# Patient Record
Sex: FEMALE | Race: WHITE | NOT HISPANIC OR LATINO | Employment: FULL TIME | URBAN - METROPOLITAN AREA
[De-identification: names, ages, dates, MRNs, and addresses within clinical notes are randomized per-mention and may not be internally consistent; named-entity substitution may affect disease eponyms.]

---

## 2017-01-21 ENCOUNTER — COMMUNICATION - HEALTHEAST (OUTPATIENT)
Dept: FAMILY MEDICINE | Facility: CLINIC | Age: 52
End: 2017-01-21

## 2017-01-21 DIAGNOSIS — I10 ESSENTIAL HYPERTENSION, BENIGN: ICD-10-CM

## 2017-03-07 ENCOUNTER — COMMUNICATION - HEALTHEAST (OUTPATIENT)
Dept: TELEHEALTH | Facility: CLINIC | Age: 52
End: 2017-03-07

## 2017-03-07 ENCOUNTER — HOSPITAL ENCOUNTER (OUTPATIENT)
Dept: MAMMOGRAPHY | Facility: CLINIC | Age: 52
Discharge: HOME | End: 2017-03-07
Attending: FAMILY MEDICINE

## 2017-03-07 DIAGNOSIS — Z12.31 VISIT FOR SCREENING MAMMOGRAM: ICD-10-CM

## 2017-03-08 ENCOUNTER — OFFICE VISIT - HEALTHEAST (OUTPATIENT)
Dept: FAMILY MEDICINE | Facility: CLINIC | Age: 52
End: 2017-03-08

## 2017-03-08 DIAGNOSIS — I10 ESSENTIAL HYPERTENSION, BENIGN: ICD-10-CM

## 2017-03-08 DIAGNOSIS — Z80.0 FHX: COLON CANCER: ICD-10-CM

## 2017-03-08 DIAGNOSIS — Z00.00 WELL ADULT EXAM: ICD-10-CM

## 2017-03-08 DIAGNOSIS — E78.00 PURE HYPERCHOLESTEROLEMIA: ICD-10-CM

## 2017-03-08 LAB
CHOLEST SERPL-MCNC: 202 MG/DL
FASTING STATUS PATIENT QL REPORTED: YES
HDLC SERPL-MCNC: 51 MG/DL
LDLC SERPL CALC-MCNC: 127 MG/DL
TRIGL SERPL-MCNC: 120 MG/DL

## 2017-03-08 ASSESSMENT — MIFFLIN-ST. JEOR: SCORE: 1397.04

## 2017-03-09 ENCOUNTER — COMMUNICATION - HEALTHEAST (OUTPATIENT)
Dept: FAMILY MEDICINE | Facility: CLINIC | Age: 52
End: 2017-03-09

## 2017-03-31 ENCOUNTER — COMMUNICATION - HEALTHEAST (OUTPATIENT)
Dept: FAMILY MEDICINE | Facility: CLINIC | Age: 52
End: 2017-03-31

## 2017-03-31 DIAGNOSIS — E78.00 HYPERCHOLESTEROLEMIA: ICD-10-CM

## 2017-06-23 ENCOUNTER — COMMUNICATION - HEALTHEAST (OUTPATIENT)
Dept: FAMILY MEDICINE | Facility: CLINIC | Age: 52
End: 2017-06-23

## 2017-06-23 DIAGNOSIS — I10 ESSENTIAL HYPERTENSION, BENIGN: ICD-10-CM

## 2017-09-21 ENCOUNTER — OFFICE VISIT - HEALTHEAST (OUTPATIENT)
Dept: FAMILY MEDICINE | Facility: CLINIC | Age: 52
End: 2017-09-21

## 2017-09-21 DIAGNOSIS — Z23 NEED FOR IMMUNIZATION AGAINST INFLUENZA: ICD-10-CM

## 2017-09-21 DIAGNOSIS — E78.00 PURE HYPERCHOLESTEROLEMIA: ICD-10-CM

## 2017-09-21 DIAGNOSIS — Z80.0 FHX: COLON CANCER: ICD-10-CM

## 2017-09-21 DIAGNOSIS — E78.00 HYPERCHOLESTEROLEMIA: ICD-10-CM

## 2017-09-21 DIAGNOSIS — I10 ESSENTIAL HYPERTENSION, BENIGN: ICD-10-CM

## 2017-09-21 LAB
CHOLEST SERPL-MCNC: 205 MG/DL
FASTING STATUS PATIENT QL REPORTED: YES
HDLC SERPL-MCNC: 44 MG/DL
LDLC SERPL CALC-MCNC: 131 MG/DL
TRIGL SERPL-MCNC: 151 MG/DL

## 2017-09-21 ASSESSMENT — MIFFLIN-ST. JEOR: SCORE: 1410.65

## 2017-09-22 ENCOUNTER — COMMUNICATION - HEALTHEAST (OUTPATIENT)
Dept: FAMILY MEDICINE | Facility: CLINIC | Age: 52
End: 2017-09-22

## 2018-03-14 ENCOUNTER — OFFICE VISIT - HEALTHEAST (OUTPATIENT)
Dept: FAMILY MEDICINE | Facility: CLINIC | Age: 53
End: 2018-03-14

## 2018-03-14 DIAGNOSIS — E78.00 HYPERCHOLESTEROLEMIA: ICD-10-CM

## 2018-03-14 DIAGNOSIS — R12 HEARTBURN: ICD-10-CM

## 2018-03-14 DIAGNOSIS — Z23 NEED FOR VACCINATION: ICD-10-CM

## 2018-03-14 DIAGNOSIS — E78.00 PURE HYPERCHOLESTEROLEMIA: ICD-10-CM

## 2018-03-14 DIAGNOSIS — I10 ESSENTIAL HYPERTENSION, BENIGN: ICD-10-CM

## 2018-03-14 DIAGNOSIS — Z00.00 WELL ADULT EXAM: ICD-10-CM

## 2018-03-14 LAB
ALBUMIN SERPL-MCNC: 4.2 G/DL (ref 3.5–5)
ALP SERPL-CCNC: 68 U/L (ref 45–120)
ALT SERPL W P-5'-P-CCNC: 16 U/L (ref 0–45)
ANION GAP SERPL CALCULATED.3IONS-SCNC: 9 MMOL/L (ref 5–18)
AST SERPL W P-5'-P-CCNC: 11 U/L (ref 0–40)
BILIRUB DIRECT SERPL-MCNC: 0.2 MG/DL
BILIRUB SERPL-MCNC: 0.6 MG/DL (ref 0–1)
BUN SERPL-MCNC: 14 MG/DL (ref 8–22)
CALCIUM SERPL-MCNC: 9.6 MG/DL (ref 8.5–10.5)
CHLORIDE BLD-SCNC: 106 MMOL/L (ref 98–107)
CHOLEST SERPL-MCNC: 206 MG/DL
CO2 SERPL-SCNC: 22 MMOL/L (ref 22–31)
CREAT SERPL-MCNC: 0.72 MG/DL (ref 0.6–1.1)
FASTING STATUS PATIENT QL REPORTED: YES
GFR SERPL CREATININE-BSD FRML MDRD: >60 ML/MIN/1.73M2
GLUCOSE BLD-MCNC: 107 MG/DL (ref 70–125)
HDLC SERPL-MCNC: 42 MG/DL
LDLC SERPL CALC-MCNC: 123 MG/DL
POTASSIUM BLD-SCNC: 4.2 MMOL/L (ref 3.5–5)
PROT SERPL-MCNC: 7.5 G/DL (ref 6–8)
SODIUM SERPL-SCNC: 137 MMOL/L (ref 136–145)
TRIGL SERPL-MCNC: 203 MG/DL

## 2018-03-14 ASSESSMENT — MIFFLIN-ST. JEOR: SCORE: 1412.35

## 2018-03-15 ENCOUNTER — COMMUNICATION - HEALTHEAST (OUTPATIENT)
Dept: FAMILY MEDICINE | Facility: CLINIC | Age: 53
End: 2018-03-15

## 2018-03-16 LAB
HPV SOURCE: NORMAL
HUMAN PAPILLOMA VIRUS 16 DNA: NEGATIVE
HUMAN PAPILLOMA VIRUS 18 DNA: NEGATIVE
HUMAN PAPILLOMA VIRUS FINAL DIAGNOSIS: NORMAL
HUMAN PAPILLOMA VIRUS OTHER HR: NEGATIVE
SPECIMEN DESCRIPTION: NORMAL

## 2018-03-19 ENCOUNTER — COMMUNICATION - HEALTHEAST (OUTPATIENT)
Dept: FAMILY MEDICINE | Facility: CLINIC | Age: 53
End: 2018-03-19

## 2018-03-20 ENCOUNTER — COMMUNICATION - HEALTHEAST (OUTPATIENT)
Dept: FAMILY MEDICINE | Facility: CLINIC | Age: 53
End: 2018-03-20

## 2018-03-29 ENCOUNTER — OFFICE VISIT - HEALTHEAST (OUTPATIENT)
Dept: FAMILY MEDICINE | Facility: CLINIC | Age: 53
End: 2018-03-29

## 2018-03-29 DIAGNOSIS — R51.9 HEADACHE: ICD-10-CM

## 2018-03-29 DIAGNOSIS — I10 ESSENTIAL HYPERTENSION, BENIGN: ICD-10-CM

## 2018-04-04 ENCOUNTER — HOSPITAL ENCOUNTER (OUTPATIENT)
Dept: MRI IMAGING | Facility: CLINIC | Age: 53
Discharge: HOME | End: 2018-04-04
Attending: FAMILY MEDICINE

## 2018-04-04 DIAGNOSIS — I10 ESSENTIAL HYPERTENSION, BENIGN: ICD-10-CM

## 2018-04-04 DIAGNOSIS — R51.9 HEADACHE: ICD-10-CM

## 2018-04-20 ENCOUNTER — HOSPITAL ENCOUNTER (OUTPATIENT)
Dept: MAMMOGRAPHY | Facility: CLINIC | Age: 53
Discharge: HOME | End: 2018-04-20
Attending: FAMILY MEDICINE

## 2018-04-20 DIAGNOSIS — Z12.31 VISIT FOR SCREENING MAMMOGRAM: ICD-10-CM

## 2018-07-10 ENCOUNTER — COMMUNICATION - HEALTHEAST (OUTPATIENT)
Dept: FAMILY MEDICINE | Facility: CLINIC | Age: 53
End: 2018-07-10

## 2018-07-10 DIAGNOSIS — K29.70 GASTRITIS: ICD-10-CM

## 2018-09-17 ENCOUNTER — COMMUNICATION - HEALTHEAST (OUTPATIENT)
Dept: FAMILY MEDICINE | Facility: CLINIC | Age: 53
End: 2018-09-17

## 2018-09-17 ENCOUNTER — OFFICE VISIT - HEALTHEAST (OUTPATIENT)
Dept: FAMILY MEDICINE | Facility: CLINIC | Age: 53
End: 2018-09-17

## 2018-09-17 DIAGNOSIS — E78.00 PURE HYPERCHOLESTEROLEMIA: ICD-10-CM

## 2018-09-17 DIAGNOSIS — I10 ESSENTIAL HYPERTENSION, BENIGN: ICD-10-CM

## 2018-09-17 DIAGNOSIS — K29.70 GASTRITIS: ICD-10-CM

## 2018-09-17 LAB
ALBUMIN SERPL-MCNC: 4 G/DL (ref 3.5–5)
ALP SERPL-CCNC: 74 U/L (ref 45–120)
ALT SERPL W P-5'-P-CCNC: 14 U/L (ref 0–45)
ANION GAP SERPL CALCULATED.3IONS-SCNC: 8 MMOL/L (ref 5–18)
AST SERPL W P-5'-P-CCNC: 12 U/L (ref 0–40)
BILIRUB DIRECT SERPL-MCNC: 0.1 MG/DL
BILIRUB SERPL-MCNC: 0.4 MG/DL (ref 0–1)
BUN SERPL-MCNC: 13 MG/DL (ref 8–22)
CALCIUM SERPL-MCNC: 9.1 MG/DL (ref 8.5–10.5)
CHLORIDE BLD-SCNC: 109 MMOL/L (ref 98–107)
CHOLEST SERPL-MCNC: 182 MG/DL
CO2 SERPL-SCNC: 22 MMOL/L (ref 22–31)
CREAT SERPL-MCNC: 0.68 MG/DL (ref 0.6–1.1)
FASTING STATUS PATIENT QL REPORTED: YES
GFR SERPL CREATININE-BSD FRML MDRD: >60 ML/MIN/1.73M2
GLUCOSE BLD-MCNC: 105 MG/DL (ref 70–125)
HDLC SERPL-MCNC: 42 MG/DL
LDLC SERPL CALC-MCNC: 111 MG/DL
POTASSIUM BLD-SCNC: 4.2 MMOL/L (ref 3.5–5)
PROT SERPL-MCNC: 6.8 G/DL (ref 6–8)
SODIUM SERPL-SCNC: 139 MMOL/L (ref 136–145)
TRIGL SERPL-MCNC: 146 MG/DL

## 2018-11-13 ENCOUNTER — COMMUNICATION - HEALTHEAST (OUTPATIENT)
Dept: FAMILY MEDICINE | Facility: CLINIC | Age: 53
End: 2018-11-13

## 2018-11-13 DIAGNOSIS — I10 ESSENTIAL HYPERTENSION, BENIGN: ICD-10-CM

## 2019-03-21 ENCOUNTER — OFFICE VISIT - HEALTHEAST (OUTPATIENT)
Dept: FAMILY MEDICINE | Facility: CLINIC | Age: 54
End: 2019-03-21

## 2019-03-21 DIAGNOSIS — I10 ESSENTIAL HYPERTENSION, BENIGN: ICD-10-CM

## 2019-03-21 DIAGNOSIS — Z00.00 WELL ADULT EXAM: ICD-10-CM

## 2019-03-21 DIAGNOSIS — E78.00 PURE HYPERCHOLESTEROLEMIA: ICD-10-CM

## 2019-03-21 LAB
ALBUMIN SERPL-MCNC: 4.3 G/DL (ref 3.5–5)
ALP SERPL-CCNC: 66 U/L (ref 45–120)
ALT SERPL W P-5'-P-CCNC: 13 U/L (ref 0–45)
ANION GAP SERPL CALCULATED.3IONS-SCNC: 12 MMOL/L (ref 5–18)
AST SERPL W P-5'-P-CCNC: 12 U/L (ref 0–40)
BILIRUB DIRECT SERPL-MCNC: 0.1 MG/DL
BILIRUB SERPL-MCNC: 0.3 MG/DL (ref 0–1)
BUN SERPL-MCNC: 12 MG/DL (ref 8–22)
CALCIUM SERPL-MCNC: 9.6 MG/DL (ref 8.5–10.5)
CHLORIDE BLD-SCNC: 106 MMOL/L (ref 98–107)
CHOLEST SERPL-MCNC: 190 MG/DL
CO2 SERPL-SCNC: 21 MMOL/L (ref 22–31)
CREAT SERPL-MCNC: 0.73 MG/DL (ref 0.6–1.1)
FASTING STATUS PATIENT QL REPORTED: YES
GFR SERPL CREATININE-BSD FRML MDRD: >60 ML/MIN/1.73M2
GLUCOSE BLD-MCNC: 103 MG/DL (ref 70–125)
HDLC SERPL-MCNC: 48 MG/DL
LDLC SERPL CALC-MCNC: 109 MG/DL
POTASSIUM BLD-SCNC: 4.1 MMOL/L (ref 3.5–5)
PROT SERPL-MCNC: 7.4 G/DL (ref 6–8)
SODIUM SERPL-SCNC: 139 MMOL/L (ref 136–145)
TRIGL SERPL-MCNC: 166 MG/DL

## 2019-03-21 ASSESSMENT — MIFFLIN-ST. JEOR: SCORE: 1421.43

## 2019-03-22 ENCOUNTER — COMMUNICATION - HEALTHEAST (OUTPATIENT)
Dept: FAMILY MEDICINE | Facility: CLINIC | Age: 54
End: 2019-03-22

## 2019-03-26 ENCOUNTER — COMMUNICATION - HEALTHEAST (OUTPATIENT)
Dept: FAMILY MEDICINE | Facility: CLINIC | Age: 54
End: 2019-03-26

## 2019-03-26 DIAGNOSIS — E78.00 HYPERCHOLESTEROLEMIA: ICD-10-CM

## 2019-05-13 ENCOUNTER — COMMUNICATION - HEALTHEAST (OUTPATIENT)
Dept: TELEHEALTH | Facility: CLINIC | Age: 54
End: 2019-05-13

## 2019-05-13 ENCOUNTER — HOSPITAL ENCOUNTER (OUTPATIENT)
Dept: MAMMOGRAPHY | Facility: CLINIC | Age: 54
Discharge: HOME | End: 2019-05-13
Attending: FAMILY MEDICINE

## 2019-05-13 DIAGNOSIS — Z12.31 VISIT FOR SCREENING MAMMOGRAM: ICD-10-CM

## 2019-08-18 ENCOUNTER — COMMUNICATION - HEALTHEAST (OUTPATIENT)
Dept: FAMILY MEDICINE | Facility: CLINIC | Age: 54
End: 2019-08-18

## 2019-08-18 DIAGNOSIS — K29.70 GASTRITIS: ICD-10-CM

## 2019-09-04 ENCOUNTER — COMMUNICATION - HEALTHEAST (OUTPATIENT)
Dept: FAMILY MEDICINE | Facility: CLINIC | Age: 54
End: 2019-09-04

## 2019-09-04 ENCOUNTER — OFFICE VISIT - HEALTHEAST (OUTPATIENT)
Dept: FAMILY MEDICINE | Facility: CLINIC | Age: 54
End: 2019-09-04

## 2019-09-04 DIAGNOSIS — I10 ESSENTIAL HYPERTENSION, BENIGN: ICD-10-CM

## 2019-09-04 DIAGNOSIS — E78.00 PURE HYPERCHOLESTEROLEMIA: ICD-10-CM

## 2019-09-04 DIAGNOSIS — Z11.4 SCREENING FOR HIV (HUMAN IMMUNODEFICIENCY VIRUS): ICD-10-CM

## 2019-09-04 DIAGNOSIS — K29.70 GASTRITIS: ICD-10-CM

## 2019-09-04 LAB
ALBUMIN SERPL-MCNC: 4.1 G/DL (ref 3.5–5)
ALP SERPL-CCNC: 76 U/L (ref 45–120)
ALT SERPL W P-5'-P-CCNC: 17 U/L (ref 0–45)
ANION GAP SERPL CALCULATED.3IONS-SCNC: 10 MMOL/L (ref 5–18)
AST SERPL W P-5'-P-CCNC: 16 U/L (ref 0–40)
BILIRUB DIRECT SERPL-MCNC: 0.2 MG/DL
BILIRUB SERPL-MCNC: 0.4 MG/DL (ref 0–1)
BUN SERPL-MCNC: 9 MG/DL (ref 8–22)
CALCIUM SERPL-MCNC: 9.2 MG/DL (ref 8.5–10.5)
CHLORIDE BLD-SCNC: 106 MMOL/L (ref 98–107)
CHOLEST SERPL-MCNC: 185 MG/DL
CO2 SERPL-SCNC: 22 MMOL/L (ref 22–31)
CREAT SERPL-MCNC: 0.7 MG/DL (ref 0.6–1.1)
FASTING STATUS PATIENT QL REPORTED: YES
GFR SERPL CREATININE-BSD FRML MDRD: >60 ML/MIN/1.73M2
GLUCOSE BLD-MCNC: 107 MG/DL (ref 70–125)
HDLC SERPL-MCNC: 45 MG/DL
HIV 1+2 AB+HIV1 P24 AG SERPL QL IA: NEGATIVE
LDLC SERPL CALC-MCNC: 102 MG/DL
POTASSIUM BLD-SCNC: 4.3 MMOL/L (ref 3.5–5)
PROT SERPL-MCNC: 7 G/DL (ref 6–8)
SODIUM SERPL-SCNC: 138 MMOL/L (ref 136–145)
TRIGL SERPL-MCNC: 189 MG/DL

## 2019-10-18 ENCOUNTER — COMMUNICATION - HEALTHEAST (OUTPATIENT)
Dept: FAMILY MEDICINE | Facility: CLINIC | Age: 54
End: 2019-10-18

## 2019-10-18 DIAGNOSIS — I10 ESSENTIAL HYPERTENSION, BENIGN: ICD-10-CM

## 2020-03-24 ENCOUNTER — COMMUNICATION - HEALTHEAST (OUTPATIENT)
Dept: FAMILY MEDICINE | Facility: CLINIC | Age: 55
End: 2020-03-24

## 2020-03-25 ENCOUNTER — COMMUNICATION - HEALTHEAST (OUTPATIENT)
Dept: FAMILY MEDICINE | Facility: CLINIC | Age: 55
End: 2020-03-25

## 2020-03-25 DIAGNOSIS — E78.00 HYPERCHOLESTEROLEMIA: ICD-10-CM

## 2020-04-01 ENCOUNTER — OFFICE VISIT - HEALTHEAST (OUTPATIENT)
Dept: FAMILY MEDICINE | Facility: CLINIC | Age: 55
End: 2020-04-01

## 2020-04-01 DIAGNOSIS — K29.70 GASTRITIS: ICD-10-CM

## 2020-04-01 DIAGNOSIS — E78.00 PURE HYPERCHOLESTEROLEMIA: ICD-10-CM

## 2020-04-01 DIAGNOSIS — I10 ESSENTIAL HYPERTENSION, BENIGN: ICD-10-CM

## 2020-04-01 DIAGNOSIS — K29.70 GASTRITIS WITHOUT BLEEDING, UNSPECIFIED CHRONICITY, UNSPECIFIED GASTRITIS TYPE: ICD-10-CM

## 2020-04-22 ENCOUNTER — COMMUNICATION - HEALTHEAST (OUTPATIENT)
Dept: FAMILY MEDICINE | Facility: CLINIC | Age: 55
End: 2020-04-22

## 2020-04-22 DIAGNOSIS — K29.70 GASTRITIS: ICD-10-CM

## 2020-04-29 ENCOUNTER — COMMUNICATION - HEALTHEAST (OUTPATIENT)
Dept: FAMILY MEDICINE | Facility: CLINIC | Age: 55
End: 2020-04-29

## 2020-04-29 DIAGNOSIS — K29.70 GASTRITIS: ICD-10-CM

## 2020-06-08 ENCOUNTER — COMMUNICATION - HEALTHEAST (OUTPATIENT)
Dept: FAMILY MEDICINE | Facility: CLINIC | Age: 55
End: 2020-06-08

## 2020-06-08 DIAGNOSIS — E78.00 HYPERCHOLESTEROLEMIA: ICD-10-CM

## 2020-06-17 ENCOUNTER — COMMUNICATION - HEALTHEAST (OUTPATIENT)
Dept: FAMILY MEDICINE | Facility: CLINIC | Age: 55
End: 2020-06-17

## 2020-08-31 ENCOUNTER — OFFICE VISIT - HEALTHEAST (OUTPATIENT)
Dept: FAMILY MEDICINE | Facility: CLINIC | Age: 55
End: 2020-08-31

## 2020-08-31 ENCOUNTER — COMMUNICATION - HEALTHEAST (OUTPATIENT)
Dept: TELEHEALTH | Facility: CLINIC | Age: 55
End: 2020-08-31

## 2020-08-31 DIAGNOSIS — Z80.0 FHX: COLON CANCER: ICD-10-CM

## 2020-08-31 DIAGNOSIS — I10 ESSENTIAL HYPERTENSION, BENIGN: ICD-10-CM

## 2020-08-31 DIAGNOSIS — Z12.31 ENCOUNTER FOR SCREENING MAMMOGRAM FOR BREAST CANCER: ICD-10-CM

## 2020-08-31 DIAGNOSIS — E55.9 VITAMIN D DEFICIENCY: ICD-10-CM

## 2020-08-31 DIAGNOSIS — E78.00 PURE HYPERCHOLESTEROLEMIA: ICD-10-CM

## 2020-08-31 DIAGNOSIS — Z00.01 ENCOUNTER FOR ROUTINE ADULT HEALTH EXAMINATION WITH ABNORMAL FINDINGS: ICD-10-CM

## 2020-08-31 DIAGNOSIS — N95.1 PERIMENOPAUSAL: ICD-10-CM

## 2020-08-31 LAB
ALBUMIN SERPL-MCNC: 4.2 G/DL (ref 3.5–5)
ALP SERPL-CCNC: 76 U/L (ref 45–120)
ALT SERPL W P-5'-P-CCNC: 14 U/L (ref 0–45)
ANION GAP SERPL CALCULATED.3IONS-SCNC: 12 MMOL/L (ref 5–18)
AST SERPL W P-5'-P-CCNC: 13 U/L (ref 0–40)
BILIRUB SERPL-MCNC: 0.4 MG/DL (ref 0–1)
BUN SERPL-MCNC: 12 MG/DL (ref 8–22)
CALCIUM SERPL-MCNC: 9 MG/DL (ref 8.5–10.5)
CHLORIDE BLD-SCNC: 104 MMOL/L (ref 98–107)
CHOLEST SERPL-MCNC: 195 MG/DL
CO2 SERPL-SCNC: 22 MMOL/L (ref 22–31)
CREAT SERPL-MCNC: 0.73 MG/DL (ref 0.6–1.1)
FASTING STATUS PATIENT QL REPORTED: YES
GFR SERPL CREATININE-BSD FRML MDRD: >60 ML/MIN/1.73M2
GLUCOSE BLD-MCNC: 93 MG/DL (ref 70–125)
HDLC SERPL-MCNC: 49 MG/DL
LDLC SERPL CALC-MCNC: 100 MG/DL
POTASSIUM BLD-SCNC: 4.6 MMOL/L (ref 3.5–5)
PROT SERPL-MCNC: 7.2 G/DL (ref 6–8)
SODIUM SERPL-SCNC: 138 MMOL/L (ref 136–145)
TRIGL SERPL-MCNC: 229 MG/DL

## 2020-08-31 ASSESSMENT — MIFFLIN-ST. JEOR: SCORE: 1432.2

## 2020-09-11 ENCOUNTER — COMMUNICATION - HEALTHEAST (OUTPATIENT)
Dept: FAMILY MEDICINE | Facility: CLINIC | Age: 55
End: 2020-09-11

## 2020-09-11 DIAGNOSIS — K29.70 GASTRITIS: ICD-10-CM

## 2020-09-13 RX ORDER — FAMOTIDINE 20 MG/1
20 TABLET, FILM COATED ORAL 2 TIMES DAILY PRN
Qty: 135 TABLET | Refills: 3 | Status: SHIPPED | OUTPATIENT
Start: 2020-09-13 | End: 2022-05-15

## 2020-10-12 ENCOUNTER — COMMUNICATION - HEALTHEAST (OUTPATIENT)
Dept: FAMILY MEDICINE | Facility: CLINIC | Age: 55
End: 2020-10-12

## 2020-10-12 DIAGNOSIS — I10 ESSENTIAL HYPERTENSION, BENIGN: ICD-10-CM

## 2020-11-16 ENCOUNTER — HOSPITAL ENCOUNTER (OUTPATIENT)
Dept: MAMMOGRAPHY | Facility: CLINIC | Age: 55
Discharge: HOME | End: 2020-11-16
Attending: FAMILY MEDICINE

## 2021-03-01 ENCOUNTER — OFFICE VISIT - HEALTHEAST (OUTPATIENT)
Dept: FAMILY MEDICINE | Facility: CLINIC | Age: 56
End: 2021-03-01

## 2021-03-01 DIAGNOSIS — Z80.0 FHX: COLON CANCER: ICD-10-CM

## 2021-03-01 DIAGNOSIS — Z12.11 SCREEN FOR COLON CANCER: ICD-10-CM

## 2021-03-01 DIAGNOSIS — E78.00 PURE HYPERCHOLESTEROLEMIA: ICD-10-CM

## 2021-03-01 DIAGNOSIS — N95.1 PERIMENOPAUSAL: ICD-10-CM

## 2021-03-01 DIAGNOSIS — I10 ESSENTIAL HYPERTENSION, BENIGN: ICD-10-CM

## 2021-03-01 LAB
CHOLEST SERPL-MCNC: 179 MG/DL
FASTING STATUS PATIENT QL REPORTED: YES
HDLC SERPL-MCNC: 44 MG/DL
LDLC SERPL CALC-MCNC: 83 MG/DL
TRIGL SERPL-MCNC: 261 MG/DL

## 2021-03-16 ENCOUNTER — AMBULATORY - HEALTHEAST (OUTPATIENT)
Dept: NURSING | Facility: CLINIC | Age: 56
End: 2021-03-16

## 2021-03-21 ENCOUNTER — COMMUNICATION - HEALTHEAST (OUTPATIENT)
Dept: FAMILY MEDICINE | Facility: CLINIC | Age: 56
End: 2021-03-21

## 2021-03-23 ENCOUNTER — COMMUNICATION - HEALTHEAST (OUTPATIENT)
Dept: FAMILY MEDICINE | Facility: CLINIC | Age: 56
End: 2021-03-23

## 2021-03-23 DIAGNOSIS — I10 ESSENTIAL HYPERTENSION, BENIGN: ICD-10-CM

## 2021-03-24 RX ORDER — ATORVASTATIN CALCIUM 40 MG/1
40 TABLET, FILM COATED ORAL AT BEDTIME
Qty: 90 TABLET | Refills: 3 | Status: SHIPPED | OUTPATIENT
Start: 2021-03-24 | End: 2022-01-09

## 2021-04-23 ENCOUNTER — RECORDS - HEALTHEAST (OUTPATIENT)
Dept: ADMINISTRATIVE | Facility: OTHER | Age: 56
End: 2021-04-23

## 2021-04-27 ENCOUNTER — AMBULATORY - HEALTHEAST (OUTPATIENT)
Dept: NURSING | Facility: CLINIC | Age: 56
End: 2021-04-27

## 2021-05-03 ENCOUNTER — COMMUNICATION - HEALTHEAST (OUTPATIENT)
Dept: FAMILY MEDICINE | Facility: CLINIC | Age: 56
End: 2021-05-03

## 2021-05-03 ENCOUNTER — COMMUNICATION - HEALTHEAST (OUTPATIENT)
Dept: SCHEDULING | Facility: CLINIC | Age: 56
End: 2021-05-03

## 2021-05-03 DIAGNOSIS — I10 ESSENTIAL HYPERTENSION, BENIGN: ICD-10-CM

## 2021-05-03 RX ORDER — METOPROLOL SUCCINATE 50 MG/1
TABLET, EXTENDED RELEASE ORAL
Qty: 90 TABLET | Refills: 1 | Status: SHIPPED | OUTPATIENT
Start: 2021-05-03 | End: 2021-11-02

## 2021-05-03 RX ORDER — LOSARTAN POTASSIUM 100 MG/1
TABLET ORAL
Qty: 90 TABLET | Refills: 1 | Status: SHIPPED | OUTPATIENT
Start: 2021-05-03 | End: 2021-10-19

## 2021-05-18 ENCOUNTER — AMBULATORY - HEALTHEAST (OUTPATIENT)
Dept: NURSING | Facility: CLINIC | Age: 56
End: 2021-05-18

## 2021-05-24 ENCOUNTER — RECORDS - HEALTHEAST (OUTPATIENT)
Dept: ADMINISTRATIVE | Facility: CLINIC | Age: 56
End: 2021-05-24

## 2021-05-27 VITALS — HEART RATE: 113 BPM | DIASTOLIC BLOOD PRESSURE: 86 MMHG | OXYGEN SATURATION: 100 % | SYSTOLIC BLOOD PRESSURE: 126 MMHG

## 2021-05-27 NOTE — TELEPHONE ENCOUNTER
Refill Approved    Rx renewed per Medication Renewal Policy. Medication was last renewed on 3/14/18.    Mary Ledesma, [facility] Triage/Med Refill 3/26/2019     Requested Prescriptions   Pending Prescriptions Disp Refills     simvastatin (ZOCOR) 80 MG tablet [Pharmacy Med Name: SIMVASTATIN TABS 80MG] 90 tablet 1     Sig: TAKE 1 TABLET EVERY EVENING    Statins Refill Protocol (Hmg CoA Reductase Inhibitors) Passed - 3/26/2019  9:00 AM       Passed - PCP or prescribing provider visit in past 12 months     Last office visit with prescriber/PCP: 9/17/2018 Geovanny Tamayo MD OR same dept: 9/17/2018 Geovanny Tamayo MD OR same specialty: 9/17/2018 Geovanny Tamayo MD  Last physical: 3/21/2019 Last MTM visit: Visit date not found   Next visit within 3 mo: Visit date not found  Next physical within 3 mo: Visit date not found  Prescriber OR PCP: Geovanny Tamayo MD  Last diagnosis associated with med order: 1. Hypercholesterolemia  - simvastatin (ZOCOR) 80 MG tablet [Pharmacy Med Name: SIMVASTATIN TABS 80MG]; TAKE 1 TABLET EVERY EVENING  Dispense: 90 tablet; Refill: 1    If protocol passes may refill for 12 months if within 3 months of last provider visit (or a total of 15 months).

## 2021-05-30 ENCOUNTER — RECORDS - HEALTHEAST (OUTPATIENT)
Dept: ADMINISTRATIVE | Facility: CLINIC | Age: 56
End: 2021-05-30

## 2021-05-30 VITALS — HEIGHT: 65 IN | BODY MASS INDEX: 29.57 KG/M2 | WEIGHT: 177.5 LBS

## 2021-05-31 VITALS — BODY MASS INDEX: 28.45 KG/M2 | HEIGHT: 66 IN | WEIGHT: 177 LBS

## 2021-05-31 NOTE — TELEPHONE ENCOUNTER
Refill Approved    Rx renewed per Medication Renewal Policy. Medication was last renewed on 7/11/2018       Tuan Crow, [facility] [place] Connection Triage/Med Refill 8/18/2019     Requested Prescriptions   Pending Prescriptions Disp Refills     famotidine (PEPCID) 20 MG tablet [Pharmacy Med Name: Famotidine Oral Tablet 20 MG] 180 tablet 1     Sig: TAKE ONE TABLET BY MOUTH TWICE DAILY       GI Medications Refill Protocol Passed - 8/18/2019  8:51 AM        Passed - PCP or prescribing provider visit in last 12 or next 3 months.     Last office visit with prescriber/PCP: 9/17/2018 Geovanny Tamayo MD OR same dept: 9/17/2018 Geovanny Tamayo MD OR same specialty: 9/17/2018 Geovanny Tamayo MD  Last physical: 3/21/2019 Last MTM visit: Visit date not found   Next visit within 3 mo: Visit date not found  Next physical within 3 mo: Visit date not found  Prescriber OR PCP: Geovanny Tamayo MD  Last diagnosis associated with med order: 1. Gastritis  - famotidine (PEPCID) 20 MG tablet [Pharmacy Med Name: Famotidine Oral Tablet 20 MG]; TAKE ONE TABLET BY MOUTH TWICE DAILY   Dispense: 180 tablet; Refill: 1    If protocol passes may refill for 12 months if within 3 months of last provider visit (or a total of 15 months).

## 2021-06-01 VITALS — BODY MASS INDEX: 29.99 KG/M2 | WEIGHT: 180 LBS | HEIGHT: 65 IN

## 2021-06-01 VITALS — BODY MASS INDEX: 30.29 KG/M2 | WEIGHT: 182 LBS

## 2021-06-01 NOTE — PROGRESS NOTES
Assessment:  1.  Hypertension, controlled.  2.  Hyperlipidemia, checking status.  3.  Gastritis controlled.    Plan: Check fasting lipid hepatic and basic profile and screen for HIV.  Changed her famotidine prescription to 20 mg-1 p.o. daily-#90 refillable x1 to her mail order pharmacy.  Continue current medication and follow-up in 6 months for her overall physical.  Encouraged her regarding regular daily exercise.    Subjective: 54-year-old female presenting for follow-up on the above.  Regarding hypertension no headaches dizziness or leg swelling.  Regarding lipids no diarrhea constipation muscle aching or muscle weakness.  Regarding her stomach she notes that she takes the famotidine once a day now and that has worked to control her heartburn difficulty.  She does get that as a prescription.   Patient Active Problem List   Diagnosis     Vitamin D Deficiency     Familial Hypercholesterolemia     Benign Essential Hypertension     FHx: colon cancer     Gastritis     Past Medical History:   Diagnosis Date     Hypertension      No Known Allergies  Current Outpatient Medications   Medication Sig Dispense Refill     cholecalciferol, vitamin D3, (VITAMIN D3) 2,000 unit Tab Take 2 tablets by mouth daily.       famotidine (PEPCID) 20 MG tablet Take 1 tablet (20 mg total) by mouth daily. 90 tablet 1     losartan (COZAAR) 100 MG tablet Take 1 tablet (100 mg total) by mouth daily. 90 tablet 3     metoprolol succinate (TOPROL-XL) 50 MG 24 hr tablet Take 1 tablet (50 mg total) by mouth daily. 90 tablet 3     simvastatin (ZOCOR) 80 MG tablet TAKE 1 TABLET EVERY EVENING 90 tablet 3     No current facility-administered medications for this visit.      All other review of systems are currently negative.  While she keeps busy taking care of their house, she does not do any regular physical exercise.    Objective:/88   Pulse 75   Temp 99.3  F (37.4  C)   Wt 187 lb 4.8 oz (85 kg)   SpO2 99%   BMI 31.17 kg/m    HEENT  examination shows no acute change.  Neck supple without adenopathy or thyromegaly.  Lungs clear.  Heart regular rate and rhythm without murmur.  Abdomen shows no masses tenderness or hepatosplenomegaly.  No pedal edema.  She is alert with clear speech.

## 2021-06-01 NOTE — TELEPHONE ENCOUNTER
Patient Returning Call  Reason for call:  The patient is returning the call.  Information relayed to patient:  The below message has been relayed to the patient. The patient was transferred to appointment scheduling.   Patient has additional questions:  Yes  If YES, what are your questions/concerns:  The patient would like to have her lipid test results mailed to her at her home.   Okay to leave a detailed message?: No call back needed

## 2021-06-01 NOTE — TELEPHONE ENCOUNTER
----- Message from Geovanny Tamayo MD sent at 9/4/2019  4:16 PM CDT -----  No sign of HIV, adequate control of lipids and normal liver and kidney function.

## 2021-06-02 VITALS — HEIGHT: 65 IN | BODY MASS INDEX: 30.32 KG/M2 | WEIGHT: 182 LBS

## 2021-06-02 VITALS — WEIGHT: 181 LBS | BODY MASS INDEX: 30.12 KG/M2

## 2021-06-02 NOTE — TELEPHONE ENCOUNTER
Refill Approved    Rx renewed per Medication Renewal Policy. Medication was last renewed on 11/13/18.    Mary Ledesma, [facility] Triage/Med Refill 10/18/2019     Requested Prescriptions   Pending Prescriptions Disp Refills     metoprolol succinate (TOPROL-XL) 50 MG 24 hr tablet [Pharmacy Med Name: METOPROLOL SUCC ER TAB 50MG] 90 tablet 4     Sig: TAKE 1 TABLET DAILY       Beta-Blockers Refill Protocol Passed - 10/18/2019  8:25 AM        Passed - PCP or prescribing provider visit in past 12 months or next 3 months     Last office visit with prescriber/PCP: 9/4/2019 Geovanny Tamayo MD OR same dept: 9/4/2019 Geovanny Tamayo MD OR same specialty: 9/4/2019 Geovanny Tamayo MD  Last physical: 3/21/2019 Last MTM visit: Visit date not found   Next visit within 3 mo: Visit date not found  Next physical within 3 mo: Visit date not found  Prescriber OR PCP: Geovanny Tamayo MD  Last diagnosis associated with med order: 1. Essential hypertension, benign  - metoprolol succinate (TOPROL-XL) 50 MG 24 hr tablet [Pharmacy Med Name: METOPROLOL SUCC ER TAB 50MG]; TAKE 1 TABLET DAILY  Dispense: 90 tablet; Refill: 4  - losartan (COZAAR) 100 MG tablet [Pharmacy Med Name: LOSARTAN TABS 100MG]; TAKE 1 TABLET DAILY  Dispense: 90 tablet; Refill: 4    2. Benign Essential Hypertension  - metoprolol succinate (TOPROL-XL) 50 MG 24 hr tablet [Pharmacy Med Name: METOPROLOL SUCC ER TAB 50MG]; TAKE 1 TABLET DAILY  Dispense: 90 tablet; Refill: 4  - losartan (COZAAR) 100 MG tablet [Pharmacy Med Name: LOSARTAN TABS 100MG]; TAKE 1 TABLET DAILY  Dispense: 90 tablet; Refill: 4    If protocol passes may refill for 12 months if within 3 months of last provider visit (or a total of 15 months).             Passed - Blood pressure filed in past 12 months     BP Readings from Last 1 Encounters:   09/04/19 132/88             losartan (COZAAR) 100 MG tablet [Pharmacy Med Name: LOSARTAN TABS 100MG] 90 tablet 4     Sig: TAKE 1 TABLET DAILY        Angiotensin Receptor Blocker Protocol Passed - 10/18/2019  8:25 AM        Passed - PCP or prescribing provider visit in past 12 months       Last office visit with prescriber/PCP: 9/4/2019 Geovanny Tamayo MD OR same dept: 9/4/2019 Geovanny Tamayo MD OR same specialty: 9/4/2019 Geovanny Tamayo MD  Last physical: 3/21/2019 Last MTM visit: Visit date not found   Next visit within 3 mo: Visit date not found  Next physical within 3 mo: Visit date not found  Prescriber OR PCP: Geovanny Tamayo MD  Last diagnosis associated with med order: 1. Essential hypertension, benign  - metoprolol succinate (TOPROL-XL) 50 MG 24 hr tablet [Pharmacy Med Name: METOPROLOL SUCC ER TAB 50MG]; TAKE 1 TABLET DAILY  Dispense: 90 tablet; Refill: 4  - losartan (COZAAR) 100 MG tablet [Pharmacy Med Name: LOSARTAN TABS 100MG]; TAKE 1 TABLET DAILY  Dispense: 90 tablet; Refill: 4    2. Benign Essential Hypertension  - metoprolol succinate (TOPROL-XL) 50 MG 24 hr tablet [Pharmacy Med Name: METOPROLOL SUCC ER TAB 50MG]; TAKE 1 TABLET DAILY  Dispense: 90 tablet; Refill: 4  - losartan (COZAAR) 100 MG tablet [Pharmacy Med Name: LOSARTAN TABS 100MG]; TAKE 1 TABLET DAILY  Dispense: 90 tablet; Refill: 4    If protocol passes may refill for 12 months if within 3 months of last provider visit (or a total of 15 months).             Passed - Serum potassium within the past 12 months     Lab Results   Component Value Date    Potassium 4.3 09/04/2019             Passed - Blood pressure filed in past 12 months     BP Readings from Last 1 Encounters:   09/04/19 132/88             Passed - Serum creatinine within the past 12 months     Creatinine   Date Value Ref Range Status   09/04/2019 0.70 0.60 - 1.10 mg/dL Final

## 2021-06-03 VITALS
SYSTOLIC BLOOD PRESSURE: 132 MMHG | DIASTOLIC BLOOD PRESSURE: 88 MMHG | OXYGEN SATURATION: 99 % | WEIGHT: 187.3 LBS | TEMPERATURE: 99.3 F | BODY MASS INDEX: 31.17 KG/M2 | HEART RATE: 75 BPM

## 2021-06-05 VITALS
BODY MASS INDEX: 30.72 KG/M2 | SYSTOLIC BLOOD PRESSURE: 124 MMHG | RESPIRATION RATE: 16 BRPM | HEART RATE: 85 BPM | OXYGEN SATURATION: 99 % | HEIGHT: 65 IN | DIASTOLIC BLOOD PRESSURE: 80 MMHG | WEIGHT: 184.38 LBS

## 2021-06-05 VITALS
WEIGHT: 179.8 LBS | DIASTOLIC BLOOD PRESSURE: 88 MMHG | HEART RATE: 103 BPM | OXYGEN SATURATION: 100 % | BODY MASS INDEX: 29.92 KG/M2 | SYSTOLIC BLOOD PRESSURE: 150 MMHG

## 2021-06-07 NOTE — TELEPHONE ENCOUNTER
Medication Question or Clarification  Who is calling: patient  What medication are you calling about (include dose and sig)?:    famotidine (PEPCID) 20 MG tablet  135 tablet  1  4/1/2020      Sig - Route: Take 1 tablet (20 mg total) by mouth 2 (two) times a day as needed for heartburn. - Oral       Who prescribed the medication?: Geovanny Tamayo MD  What is your question/concern?: Patient is requesting a new prescription for this medication to be sent to [facility] instead as she pays out of pocket for this medication. Please advise!  Requested Pharmacy: [facility] in [place]  Okay to leave a detailed message?: Yes

## 2021-06-07 NOTE — PROGRESS NOTES
"Sangeeta Valenzuela is a 55 y.o. female who is being evaluated via a billable telephone visit.      The patient has been notified of following:     \"This telephone visit will be conducted via a call between you and your physician/provider. We have found that certain health care needs can be provided without the need for a physical exam.  This service lets us provide the care you need with a short phone conversation.  If a prescription is necessary we can send it directly to your pharmacy.  If lab work is needed we can place an order for that and you can then stop by our lab to have the test done at a later time.    If during the course of the call the physician/provider feels a telephone visit is not appropriate, you will not be charged for this service.\"     Patient has given verbal consent to a Telephone visit? Yes    Sangeeta Valenzuela complains of    Chief Complaint   Patient presents with     Medication Management       I have reviewed and updated the patient's Past Medical History, Social History, Family History and Medication List.    ALLERGIES  Patient has no known allergies.    Additional provider notes: 55-year-old female with the issues as below.  Regarding hypertension she is not having any headaches or dizziness or leg swelling.  She has no trouble taking her medication.  Regarding lipids no diarrhea constipation muscle aching or muscle weakness.  She is exercising about half hour a day on a treadmill that they got in January and feels that has been helpful.  She occasionally gets out for a walk.  Regarding her stomach she does note that she has had more times here in the last month where she had to take the famotidine twice a day because of some heartburn but that has settled it down.  Then when she is able to just take it once a day she does that.  All other review of systems are negative.  No Known Allergies  Current Outpatient Medications   Medication Sig Dispense Refill     cholecalciferol, " vitamin D3, (VITAMIN D3) 2,000 unit Tab Take 2 tablets by mouth daily.       famotidine (PEPCID) 20 MG tablet Take 1 tablet (20 mg total) by mouth 2 (two) times a day as needed for heartburn. 135 tablet 1     losartan (COZAAR) 100 MG tablet TAKE 1 TABLET DAILY 90 tablet 3     metoprolol succinate (TOPROL-XL) 50 MG 24 hr tablet TAKE 1 TABLET DAILY 90 tablet 3     simvastatin (ZOCOR) 80 MG tablet TAKE 1 TABLET EVERY EVENING 90 tablet 0     No current facility-administered medications for this visit.      She does not smoke.  Average alcohol is about 3-4 drinks per week.    Assessment/Plan:  1. Benign Essential Hypertension     2. Familial Hypercholesterolemia     3. Gastritis without bleeding, unspecified chronicity, unspecified gastritis type     4. Gastritis  famotidine (PEPCID) 20 MG tablet       Refill famotidine as 20 mg twice a day as needed-#135-refill x1 to her mail order pharmacy.  Advised that she minimize and avoid nonsteroidal anti-inflammatories as they can aggravate gastritis, avoid spicy foods, continue to minimize alcohol etc.  Plan at minimum follow-up for physical in September and overall recheck but earlier on the med check depending on the restrictions due to the coronavirus etc.  She understands and agrees.  Continue diet and exercise efforts.    Phone call duration:  11 minutes    Geovanny Tamayo MD

## 2021-06-07 NOTE — TELEPHONE ENCOUNTER
I did refill on the simvastatin.  Which she is a little overdue for her 6-month med check visit and ask you to set up phone visit

## 2021-06-07 NOTE — TELEPHONE ENCOUNTER
Spoke to pt and she agreed to change to tele visit for med check on 4/1/20 and she will call back in June for July px visit

## 2021-06-07 NOTE — TELEPHONE ENCOUNTER
Patient presents with cc of sore throat,cough-nonproductive for 6 days. Right ear pain started today. Tmax 101.5.h/o strep. No flu shot this year. Please review. Prescription cancelled through [facility]. Needing prescription sent to [facility] now.

## 2021-06-07 NOTE — TELEPHONE ENCOUNTER
Left a message for the patient. Is she wanting the order that was [facility] to [facility] cancelled? Or just wanting a small supply sent to [facility] to get her through until she receives the order from [facility]?

## 2021-06-08 NOTE — TELEPHONE ENCOUNTER
Refill Approved    Rx renewed per Medication Renewal Policy. Medication was last renewed on 3/25/2020. Virtual visit 4/1/2020    Eileen Henry, [facility] Triage/Med Refill 6/10/2020     Requested Prescriptions   Pending Prescriptions Disp Refills     simvastatin (ZOCOR) 80 MG tablet [Pharmacy Med Name: SIMVASTATIN TABS 80MG] 90 tablet 3     Sig: TAKE 1 TABLET EVERY EVENING       Statins Refill Protocol (Hmg CoA Reductase Inhibitors) Passed - 6/8/2020  7:01 AM        Passed - PCP or prescribing provider visit in past 12 months      Last office visit with prescriber/PCP: 9/4/2019 Geovanny Tamayo MD OR same dept: 9/4/2019 Geovanny Tamayo MD OR same specialty: 9/4/2019 Geovanny Tamayo MD  Last physical: 3/21/2019 Last MTM visit: Visit date not found   Next visit within 3 mo: Visit date not found  Next physical within 3 mo: Visit date not found  Prescriber OR PCP: Geovanny Tamayo MD  Last diagnosis associated with med order: 1. Hypercholesterolemia  - simvastatin (ZOCOR) 80 MG tablet [Pharmacy Med Name: SIMVASTATIN TABS 80MG]; TAKE 1 TABLET EVERY EVENING  Dispense: 90 tablet; Refill: 3    If protocol passes may refill for 12 months if within 3 months of last provider visit (or a total of 15 months).

## 2021-06-08 NOTE — TELEPHONE ENCOUNTER
I called and left message to make sure they were aware that I was retiring later this summer.  Asked them to call back if questions about the alternatives for them to be seeing after I retire.  Both she and her  are then due for their med checks either at the end of the summer or in the fall.

## 2021-06-09 NOTE — PROGRESS NOTES
Assessment:      Healthy female exam.    1. Well adult exam     2. Benign Essential Hypertension  Basic Metabolic Panel   3. Familial Hypercholesterolemia  Lipid Cascade    Hepatic Profile   4. FHx: colon cancer            Plan:       Check lab as above.  refill meds as needed for next 6 months.  Next med check in 6 months.  Continue regular exercise and discussed healthy diet.  Gave her copy of honoring choices and discussed advanced directives.  She would have her  be her healthcare agent and would have initial resuscitation.  She will get us a copy of that when she is done.  Her recent mammogram was normal and since she is low risk for cervical cancer and had normal Pap 2 years ago did not need that today.    Subjective:      Sangeeta Valenzuela is a 52 y.o. female who presents for an annual exam. The patient is sexually active. The patient participates in regular exercise: yes. The patient reports that there is not domestic violence in her life. Generally doing well.  No change in her family history.  She had colonoscopy in April 2016 due to her brothers history of colon cancer, her screen was negative and next will be due in 2021.    Healthy Habits:   Regular Exercise: Yes  Sunscreen Use: Yes  Healthy Diet: Yes  Dental Visits Regularly: Yes  Seat Belt: Yes  Sexually active: Yes  Self Breast Exam Monthly:Yes  Hemoccults: No  Flex Sig: No  Colonoscopy: Yes  Lipid Profile: Yes  Glucose Screen: Yes  Prevention of Osteoporosis: Yes  Last Dexa: No  Guns at Home:  Yes  Guns Safety Locks:  Yes      Immunization History   Administered Date(s) Administered     DT (pediatric) 12/15/1997, 01/19/2000     Hep A, historic 02/18/2008, 08/25/2008     Influenza E2d3-01, 12/29/2009     Td, historic 02/18/2008     Tdap 02/18/2008     Immunization status: up to date and documented.    No exam data present    Gynecologic History  Patient's last menstrual period was 02/14/2017.  Contraception: vasectomy  Last Pap:  3/5/2015 . Results were: normal  Last mammogram: 3/7/2017. Results were: normal      OB History    Para Term  AB SAB TAB Ectopic Multiple Living   2 2 2             # Outcome Date GA Lbr Cristobal/2nd Weight Sex Delivery Anes PTL Lv   2 Term            1 Term                   Current Outpatient Prescriptions   Medication Sig Dispense Refill     cholecalciferol, vitamin D3, (VITAMIN D3) 2,000 unit Tab Take 2 tablets by mouth daily.       losartan (COZAAR) 50 MG tablet TAKE 1 TABLET DAILY 90 tablet 0     metoprolol succinate (TOPROL-XL) 50 MG 24 hr tablet TAKE 1 TABLET DAILY 90 tablet 3     simvastatin (ZOCOR) 80 MG tablet TAKE 1 TABLET EVERY EVENING 90 tablet 1     No current facility-administered medications for this visit.      Past Medical History:   Diagnosis Date     Hypertension      Past Surgical History:   Procedure Laterality Date     ORIF FIBULA FRACTURE Right 2013     Review of patient's allergies indicates no known allergies.  Family History   Problem Relation Age of Onset     Heart disease Father      Heart attack Father      Breast cancer Maternal Grandmother 57     Breast cancer Maternal Aunt      Approx age in 60's     No Medical Problems Sister      Cancer Brother 68     colon     Cancer Brother 63     colon     No Medical Problems Sister      No Medical Problems Brother      No Medical Problems Brother      Social History     Social History     Marital status:      Spouse name: N/A     Number of children: N/A     Years of education: N/A     Occupational History     Not on file.     Social History Main Topics     Smoking status: Never Smoker     Smokeless tobacco: Not on file     Alcohol use 2.4 oz/week     4 Glasses of wine per week     Drug use: No     Sexual activity: Yes     Partners: Male     Birth control/ protection: Surgical     Other Topics Concern     Not on file     Social History Narrative       Review of Systems  Review of Systems          Objective:         Vitals:  "   03/08/17 0800   BP: 130/86   Pulse: 90   Temp: 98.5  F (36.9  C)   Weight: 177 lb 8 oz (80.5 kg)   Height: 5' 4.75\" (1.645 m)     Body mass index is 29.77 kg/(m^2).    Physical  Physical Exam   Head normocephalic.  Extremities and TMs normal.  Eyes show pupils equal round and react to light and accommodation with clear sclerae and normal funduscopic exam.  Nose and oropharynx negative.  Neck supple without adenopathy or thyromegaly.  Lungs clear.  Heart regular rate and rhythm without murmur.  Breasts are no masses tenderness or discharge.  Abdomen shows no masses tenderness or hepatosplenomegaly.  Pelvic examination shows normal external genitalia, normal BUS and vagina normal cervix.  Bimanual exam shows no masses or tenderness and rectovaginal exam confirms.  Skin normal.  No pedal edema.  Normal peripheral pulses including dorsalis pedis.  No focal neurologic abnormalities.       "

## 2021-06-11 NOTE — PROGRESS NOTES
Assessment:      Healthy female exam.      1. Encounter for routine adult health examination with abnormal findings  The patient is working full-time  Exercises at least 4 times per week  BMI 31  No tobacco use  3-4 quadrants per week  Good amounts of fruits and vegetables along with water daily  Recommend Shingrix but will check with insurance to see if covered    2. Benign Essential Hypertension  Blood pressure is controlled on losartan 100 mg daily  Metoprolol XL 50 mg daily    - Comprehensive Metabolic Panel, reviewed and normal    3. Familial Hypercholesterolemia  The patient takes simvastatin 80 mg daily and has primarily had problems with triglycerides with the last level being 189 and mildly low HDL cholesterol at 45    - Lipid [place] FASTING, 195/229/49/100  Consider switching from simvastatin to atorvastatin which would provide better triglyceride management.  Other option would be to start fish oil thousand milligrams twice a day.  Continue with lower carbohydrate and simple sugar diet.    4. Vitamin D deficiency  Patient has had a history of vitamin D deficiency currently taking 4000 international units of vitamin D3 daily.  Last vitamin D level 52 in     5. Encounter for screening mammogram for breast cancer  Last mammogram was May 13, 2019 which was benign and will do another mammogram in the near future    - Mammo Screening Bilateral    6. Perimenopausal  Last menstrual period 2020 which is been a longer period.  In May Lissy and July she did not have a period.  She does not have any hot flashes.   2 para 2 with 2 living children.    7. FHx: colon cancer  The patient has 2 siblings who have had colon cancer.  The patient had her last colonoscopy 2016 and she had a hyperplastic polyp with a plan for 5-year follow-up meaning that she will need that in .     Plan:       All questions answered.  Diagnosis explained in detail, including differential.  Discussed healthy  lifestyle modifications.  Follow up: Return in 1 year (on 2021).    [employer] message with test results  Return to clinic 1 year for preventative healthcare visit    Subjective:      Sangeeta Valenzuela is a 55 y.o. female who presents for an annual exam. The patient is sexually active. The patient participates in regular exercise: yes. The patient reports that there is not domestic violence in her life.     Healthy Habits:   Regular Exercise: Yes  Sunscreen Use: Yes  Healthy Diet: Yes  Dental Visits Regularly: Yes  Seat Belt: Yes  Sexually active: Yes  Self Breast Exam Monthly:Yes  Hemoccults: No  Flex Sig: No  Colonoscopy: Yes  Lipid Profile: Yes  Glucose Screen: Yes  Prevention of Osteoporosis: Yes  Last Dexa: No  Guns at Home:  Yes  Guns Safety Locks:  Yes      Immunization History   Administered Date(s) Administered     DT (pediatric) 12/15/1997, 2000     Hep A, historic 2008, 2008     Influenza [id number], 2009     Influenza, inj, historic,unspecified 10/31/2019     Influenza,seasonal,quad inj =/> 6months 2017     Td, adult adsorbed, PF 2018     Td,adult,historic,unspecified 2008     Tdap 2008     Immunization status: We will look into shingles vaccine and if covered by insurance        Gynecologic History  Patient's last menstrual period was 2020.  Contraception: none  Last Pap: 3/14/2018. Results were: Normal  Last mammogram: 2019. Results were: Benign      OB History    Para Term  AB Living   2 2 2         SAB TAB Ectopic Multiple Live Births                  # Outcome Date GA Lbr Cristobal/2nd Weight Sex Delivery Anes PTL Lv   2 Term            1 Term                Current Outpatient Medications   Medication Sig Dispense Refill     cholecalciferol, vitamin D3, (VITAMIN D3) 2,000 unit Tab Take 2 tablets by mouth daily.       famotidine (PEPCID) 20 MG tablet Take 1 tablet (20 mg total) by mouth 2 (two) times a day as needed for heartburn.  135 tablet 1     losartan (COZAAR) 100 MG tablet TAKE 1 TABLET DAILY 90 tablet 3     metoprolol succinate (TOPROL-XL) 50 MG 24 hr tablet TAKE 1 TABLET DAILY 90 tablet 3     simvastatin (ZOCOR) 80 MG tablet TAKE 1 TABLET EVERY EVENING 90 tablet 3     No current facility-administered medications for this visit.      Past Medical History:   Diagnosis Date     Hyperlipemia      Hypertension      Past Surgical History:   Procedure Laterality Date     ORIF FIBULA FRACTURE Right 2013     Patient has no known allergies.  Family History   Problem Relation Age of Onset     Heart disease Father      Heart attack Father      Breast cancer Maternal Grandmother 57     Breast cancer Maternal Aunt         Approx age in 60's     Cancer Brother 68        colon,  age 75 heart attack     Heart attack Brother      Cancer Brother 63        colon     Social History     Socioeconomic History     Marital status:      Spouse name: Not on file     Number of children: Not on file     Years of education: Not on file     Highest education level: Not on file   Occupational History     Not on file   Social Needs     Financial resource strain: Not on file     Food insecurity     Worry: Not on file     Inability: Not on file     Transportation needs     Medical: Not on file     Non-medical: Not on file   Tobacco Use     Smoking status: Never Smoker     Smokeless tobacco: Never Used   Substance and Sexual Activity     Alcohol use: Yes     Alcohol/week: 4.0 standard drinks     Types: 4 Glasses of wine per week     Drug use: No     Sexual activity: Yes     Partners: Male     Birth control/protection: Surgical   Lifestyle     Physical activity     Days per week: 5 days     Minutes per session: 30 min     Stress: Not on file   Relationships     Social connections     Talks on phone: Not on file     Gets together: Not on file     Attends Alevism service: Not on file     Active member of club or organization: Not on file     Attends  "meetings of clubs or organizations: Not on file     Relationship status: Not on file     Intimate partner violence     Fear of current or ex partner: Not on file     Emotionally abused: Not on file     Physically abused: Not on file     Forced sexual activity: Not on file   Other Topics Concern     Not on file   Social History Narrative    Exercise treadmill 5 times/week 20-30 minutes       Review of Systems  Review of Systems   Constitutional: Negative.    HENT: Negative.    Eyes: Negative.    Respiratory: Negative.    Cardiovascular: Negative.    Gastrointestinal: Negative.    Endocrine: Negative.    Genitourinary: Positive for menstrual problem.        Primarily noticing changes in her menstrual cycles with missing periods and now having a heavier longer.  In the month of August.   Musculoskeletal: Negative.    Skin: Negative.    Allergic/Immunologic: Negative.    Neurological: Negative.    Hematological: Negative.    Psychiatric/Behavioral: Negative.              Objective:         Vitals:    08/31/20 0851   BP: 124/80   Pulse: 85   Resp: 16   SpO2: 99%   Weight: 184 lb 6 oz (83.6 kg)   Height: 5' 5\" (1.651 m)     Body mass index is 30.68 kg/m .    Physical  Physical Exam  Vitals signs and nursing note reviewed.   Constitutional:       General: She is not in acute distress.     Appearance: Normal appearance. She is not ill-appearing.   HENT:      Head: Normocephalic and atraumatic.      Right Ear: Tympanic membrane, ear canal and external ear normal.      Left Ear: Tympanic membrane, ear canal and external ear normal.      Nose: Nose normal.      Mouth/Throat:      Mouth: Mucous membranes are moist.      Pharynx: Oropharynx is clear.   Eyes:      Extraocular Movements: Extraocular movements intact.      Conjunctiva/sclera: Conjunctivae normal.      Pupils: Pupils are equal, round, and reactive to light.   Neck:      Musculoskeletal: Normal range of motion and neck supple. No neck rigidity.      Vascular: No " carotid bruit.   Cardiovascular:      Rate and Rhythm: Normal rate and regular rhythm.      Pulses: Normal pulses.      Heart sounds: Normal heart sounds. No murmur.   Pulmonary:      Effort: Pulmonary effort is normal.      Breath sounds: Normal breath sounds.   Abdominal:      General: Abdomen is flat. There is no distension.      Palpations: There is no mass.      Tenderness: There is no abdominal tenderness. There is no guarding.      Hernia: No hernia is present.   Musculoskeletal: Normal range of motion.         General: No swelling or deformity.      Right lower leg: No edema.      Left lower leg: No edema.   Lymphadenopathy:      Cervical: No cervical adenopathy.   Skin:     General: Skin is warm.      Capillary Refill: Capillary refill takes less than 2 seconds.      Findings: No lesion or rash.   Neurological:      General: No focal deficit present.      Mental Status: She is alert.      Cranial Nerves: No cranial nerve deficit.      Sensory: No sensory deficit.      Motor: No weakness.      Gait: Gait normal.   Psychiatric:         Mood and Affect: Mood normal.         Behavior: Behavior normal.         Thought Content: Thought content normal.         Judgment: Judgment normal.

## 2021-06-11 NOTE — TELEPHONE ENCOUNTER
RN cannot approve Refill Request    RN can NOT refill this medication No established PCP. Last office visit: 9/4/2019 Geovanny Tamayo MD Last Physical: 3/21/2019 Last MTM visit: Visit date not found Last visit same specialty: 9/4/2019 Geovanny Tamayo MD.  Next visit within 3 mo: Visit date not found  Next physical within 3 mo: Visit date not found      Marjan Palmer, [facility] Triage/Med Refill 9/12/2020    Requested Prescriptions   Pending Prescriptions Disp Refills     famotidine (PEPCID) 20 MG tablet [Pharmacy Med Name: Famotidine Oral Tablet 20 MG] 135 tablet 0     Sig: Take 1 tablet (20 mg total) by mouth 2 (two) times a day as needed for heartburn.       GI Medications Refill Protocol Passed - 9/11/2020  1:39 PM        Passed - PCP or prescribing provider visit in last 12 or next 3 months.     Last office visit with prescriber/PCP: 9/4/2019 Geovanny Tamayo MD OR same dept: Visit date not found OR same specialty: 9/4/2019 Geovanny Tamayo MD  Last physical: 3/21/2019 Last MTM visit: Visit date not found   Next visit within 3 mo: Visit date not found  Next physical within 3 mo: Visit date not found  Prescriber OR PCP: Geovanny Tamayo MD  Last diagnosis associated with med order: 1. Gastritis  - famotidine (PEPCID) 20 MG tablet [Pharmacy Med Name: Famotidine Oral Tablet 20 MG]; Take 1 tablet (20 mg total) by mouth 2 (two) times a day as needed for heartburn.  Dispense: 135 tablet; Refill: 0    If protocol passes may refill for 12 months if within 3 months of last provider visit (or a total of 15 months).

## 2021-06-11 NOTE — PROGRESS NOTES
Sangeeta,  It was a pleasure to see you in the clinic this week.  Your test results are viewable in [facility].  I will review those results in this message.    The lipids show that your total cholesterol and your LDL cholesterol which is the bad cholesterol are at goal.  The triglycerides are moderately elevated at 229 which is up from a year ago.  The HDL cholesterol is the good cholesterol and is 49 which is just slightly low.  What will improve triglycerides would be a lower carbohydrate diet and trying to minimize sweetened drinks and simple sugars.  Simvastatin does help the triglycerides a small amount but it works better on cholesterol.  You could consider taking some fish oil which is a omega-3 fatty acid 1,000 milligrams once or twice a day which does lower triglycerides.  The other option would be to switch simvastatin to atorvastatin which is a good cholesterol lowering medication but also works a bit better on the triglycerides.    Your metabolic panel is completely normal with normal electrolytes including calcium.  The blood glucose is a screening test for diabetes which was normal.  The BUN, creatinine and GFR are kidney function tests which are normal.  The remainder of the tests look at liver function which are normal.    I encourage you to keep up with your exercise program and even 5 to 10 pounds of weight loss can help the triglycerides.  Send me a message back if you would like to switch the simvastatin to atorvastatin.  Otherwise you could certainly get over-the-counter omega-3 fatty acid (fish oil) 1,000 milligrams and start out once a day and if tolerated can increase it to twice daily.  We will plan to see you back in the clinic in 1 year.  Best regards,  Dr. Constantino

## 2021-06-12 NOTE — TELEPHONE ENCOUNTER
Refill Approved    Rx renewed per Medication Renewal Policy. Medication was last renewed on 10/18/2019.    Ilsa Orellana, [facility] Triage/Med Refill 10/13/2020     Requested Prescriptions   Pending Prescriptions Disp Refills     metoprolol succinate (TOPROL-XL) 50 MG 24 hr tablet [Pharmacy Med Name: METOPROLOL SUCCINATE ER TABS 50MG] 90 tablet 3     Sig: TAKE 1 TABLET DAILY       Beta-Blockers Refill Protocol Passed - 10/12/2020  6:56 AM        Passed - PCP or prescribing provider visit in past 12 months or next 3 months     Last office visit with prescriber/PCP: 9/4/2019 Geovanny Tamayo MD OR same dept: Visit date not found OR same specialty: 9/4/2019 Geovanny Tamayo MD  Last physical: 3/21/2019 Last MTM visit: Visit date not found   Next visit within 3 mo: Visit date not found  Next physical within 3 mo: Visit date not found  Prescriber OR PCP: Geovanny Tamayo MD  Last diagnosis associated with med order: 1. Essential hypertension, benign  - metoprolol succinate (TOPROL-XL) 50 MG 24 hr tablet [Pharmacy Med Name: METOPROLOL SUCCINATE ER TABS 50MG]; TAKE 1 TABLET DAILY  Dispense: 90 tablet; Refill: 3  - losartan (COZAAR) 100 MG tablet [Pharmacy Med Name: LOSARTAN TABS 100MG]; TAKE 1 TABLET DAILY  Dispense: 90 tablet; Refill: 3    2. Benign Essential Hypertension  - metoprolol succinate (TOPROL-XL) 50 MG 24 hr tablet [Pharmacy Med Name: METOPROLOL SUCCINATE ER TABS 50MG]; TAKE 1 TABLET DAILY  Dispense: 90 tablet; Refill: 3  - losartan (COZAAR) 100 MG tablet [Pharmacy Med Name: LOSARTAN TABS 100MG]; TAKE 1 TABLET DAILY  Dispense: 90 tablet; Refill: 3    If protocol passes may refill for 12 months if within 3 months of last provider visit (or a total of 15 months).             Passed - Blood pressure filed in past 12 months     BP Readings from Last 1 Encounters:   08/31/20 124/80                losartan (COZAAR) 100 MG tablet [Pharmacy Med Name: LOSARTAN TABS 100MG] 90 tablet 3     Sig: TAKE 1  TABLET DAILY       Angiotensin Receptor Blocker Protocol Passed - 10/12/2020  6:56 AM        Passed - PCP or prescribing provider visit in past 12 months       Last office visit with prescriber/PCP: 9/4/2019 Geovanny Tamayo MD OR same dept: Visit date not found OR same specialty: 9/4/2019 Geovanny Tamayo MD  Last physical: 3/21/2019 Last MTM visit: Visit date not found   Next visit within 3 mo: Visit date not found  Next physical within 3 mo: Visit date not found  Prescriber OR PCP: Geovanny Tamayo MD  Last diagnosis associated with med order: 1. Essential hypertension, benign  - metoprolol succinate (TOPROL-XL) 50 MG 24 hr tablet [Pharmacy Med Name: METOPROLOL SUCCINATE ER TABS 50MG]; TAKE 1 TABLET DAILY  Dispense: 90 tablet; Refill: 3  - losartan (COZAAR) 100 MG tablet [Pharmacy Med Name: LOSARTAN TABS 100MG]; TAKE 1 TABLET DAILY  Dispense: 90 tablet; Refill: 3    2. Benign Essential Hypertension  - metoprolol succinate (TOPROL-XL) 50 MG 24 hr tablet [Pharmacy Med Name: METOPROLOL SUCCINATE ER TABS 50MG]; TAKE 1 TABLET DAILY  Dispense: 90 tablet; Refill: 3  - losartan (COZAAR) 100 MG tablet [Pharmacy Med Name: LOSARTAN TABS 100MG]; TAKE 1 TABLET DAILY  Dispense: 90 tablet; Refill: 3    If protocol passes may refill for 12 months if within 3 months of last provider visit (or a total of 15 months).             Passed - Serum potassium within the past 12 months     Lab Results   Component Value Date    Potassium 4.6 08/31/2020             Passed - Blood pressure filed in past 12 months     BP Readings from Last 1 Encounters:   08/31/20 124/80             Passed - Serum creatinine within the past 12 months     Creatinine   Date Value Ref Range Status   08/31/2020 0.73 0.60 - 1.10 mg/dL Final

## 2021-06-13 NOTE — PROGRESS NOTES
"Assessment:  1.  Hypertension controlled.  2.  Hyperlipidemia, checking status.  3.  Family history of colon cancer.  4.  Need for influenza immunization.    Plan: Check fasting lipid hepatic and basic profiles.  Renewed 3 month supply with additional refill for the losartan, metoprolol, and simvastatin.  Continue diet and exercise efforts.  Follow-up in 6 months for next med check or earlier as needed.  Continue current vitamin D for maintenance.    Subjective: 52-year-old female presenting for follow-up of the above.  Regarding hypertension no headaches dizziness or leg swelling.  Regarding lipids no diarrhea constipation muscle aching or muscle weakness.  Does try to keep physically active with walking.  Had her colonoscopy in April 2016.  Does continue on the vitamin D pill once a day.  Last vitamin D level was 1 year ago.  Past Medical History:   Diagnosis Date     Hypertension      No Known Allergies  Current Outpatient Prescriptions   Medication Sig Dispense Refill     cholecalciferol, vitamin D3, (VITAMIN D3) 2,000 unit Tab Take 2 tablets by mouth daily.       losartan (COZAAR) 50 MG tablet Take 1 tablet (50 mg total) by mouth daily. 90 tablet 1     metoprolol succinate (TOPROL-XL) 50 MG 24 hr tablet Take 1 tablet (50 mg total) by mouth daily. 90 tablet 1     simvastatin (ZOCOR) 80 MG tablet Take 1 tablet (80 mg total) by mouth every evening. 90 tablet 1     No current facility-administered medications for this visit.      All other review of systems are currently negative.    Objective:/80 (Patient Site: Right Arm, Patient Position: Sitting, Cuff Size: Adult Large)  Pulse 70  Ht 5' 5.75\" (1.67 m)  Wt 177 lb (80.3 kg)  BMI 28.79 kg/m2  HEENT examination shows no acute change.  Neck supple without adenopathy or thyromegaly.  Lungs clear.  Heart regular rate and rhythm without murmur.  Abdomen shows no masses tenderness or hepatosplenomegaly.  No pedal edema.  She is alert with clear speech.    "

## 2021-06-15 PROBLEM — Z80.0 FHX: COLON CANCER: Status: ACTIVE | Noted: 2017-03-08

## 2021-06-15 NOTE — PROGRESS NOTES
ASSESSMENT/PLAN:       1. Benign Essential Hypertension  I like to have the patient come back in a couple weeks for the CSS calendar to have blood pressure rechecked  If still elevated would consider increasing the metoprolol to 100 mg daily or adding hydrochlorothiazide 12.5 mg daily    2. Familial Hypercholesterolemia    - Lipid [place] FASTING, 179/261/44/83  The total cholesterol slightly improved and the triglycerides somewhat more elevated  Note that the patient actually has lost 5 pounds in the last 6 months  Probably not exercising quite as much as usual  We will switch to atorvastatin 40 mg daily    3. Screen for colon cancer    - Ambulatory referral for Colonoscopy to be done in April 2021    4. FHx: colon cancer  Note also has a family history of breast cancer and should be having regular mammograms on a yearly basis    5. Perimenopausal  The patient is not having any symptoms of menopause such as hot flashes or heavy bleeding.  Continue to follow and informed her that there is a wide range of normal for menopause.    Test results by my chart  Follow-up 6 months preventative health visit  Level of medical decision making moderate  1 chronic condition not controlled that being hypertension  Amount of data reviewed 2 unique tests that were ordered with 1 test being interpreted  Risk of complications moderate requiring prescription drug management along with preventative healthcare management for positive family history colon cancer and breast cancer        Matteo Constantino MD      PROGRESS NOTE   3/2/2021    SUBJECTIVE:  Sangeeta Valenzuela is a 56 y.o. female  who presents for   Chief Complaint   Patient presents with     Follow-up     Med Check, no concerns        1. Benign Essential Hypertension  The patient is currently on losartan 100 mg daily as well as metoprolol XL 50 mg daily for hypertension.  Patient states that she was quite rushed today getting to the clinic.  She did take her blood pressure  medicine about an hour prior to being seen.  She does not have the means to check her blood pressure at home.  No side effects from the medication.  No significant bradycardia.  No symptoms of cold extremities.    2. Familial Hypercholesterolemia  The patient has a strong family history of elevated cholesterol and the patient is on simvastatin 80 mg daily  Father  at age 57 related to heart disease and brother has had a heart attack in his early 60s.  Patient primarily currently has had elevated triglycerides and considering switching from simvastatin to atorvastatin.    3. Screen for colon cancer  Last colonoscopy was  with a 5-year follow-up plan.  The patient has significant diverticulosis but no polyps were seen last colonoscopy.  Follow-up 5 years because of family history of colon cancer    4. FHx: colon cancer  2 siblings have had colon cancer  2 other family members not first-degree relatives have had breast cancer    5. Perimenopausal  The patient continues to have menstrual periods but somewhat irregular.  No hot flashes  .  Seem to occur about every 5 weeks average flow no clots or cramps.    Patient Active Problem List   Diagnosis     Vitamin D Deficiency     Familial Hypercholesterolemia     Benign Essential Hypertension     FHx: colon cancer     Gastritis       Current Outpatient Medications   Medication Sig Dispense Refill     cholecalciferol, vitamin D3, (VITAMIN D3) 2,000 unit Tab Take 2 tablets by mouth daily.       famotidine (PEPCID) 20 MG tablet Take 1 tablet (20 mg total) by mouth 2 (two) times a day as needed for heartburn. 135 tablet 3     losartan (COZAAR) 100 MG tablet TAKE 1 TABLET DAILY 90 tablet 3     metoprolol succinate (TOPROL-XL) 50 MG 24 hr tablet TAKE 1 TABLET DAILY 90 tablet 3     simvastatin (ZOCOR) 80 MG tablet TAKE 1 TABLET EVERY EVENING 90 tablet 3     No current facility-administered medications for this visit.        Social History     Tobacco Use   Smoking Status  Never Smoker   Smokeless Tobacco Never Used           OBJECTIVE:        Recent Results (from the past 240 hour(s))   Lipid [place] FASTING   Result Value Ref Range    Cholesterol 179 <=199 mg/dL    Triglycerides 261 (H) <=149 mg/dL    HDL Cholesterol 44 (L) >=50 mg/dL    LDL Calculated 83 <=129 mg/dL    Patient Fasting > 8hrs? Yes        Vitals:    03/01/21 0723 03/01/21 0725 03/01/21 0750   BP: 154/84 150/84 150/88   Patient Site:   Right Arm   Patient Position:   Sitting   Cuff Size:   Adult Regular   Pulse: (!) 103     SpO2: 100%     Weight: 179 lb 12.8 oz (81.6 kg)       Weight: 179 lb 12.8 oz (81.6 kg)          Physical Exam:  GENERAL APPEARANCE: Very pleasant 56-year-old female, NAD, well hydrated, well nourished  SKIN:  Normal skin turgor, no lesions/rashes   HEENT: moist mucous membranes, no rhinorrhea  NECK: Normal without adenopathy or masses, no carotid bruits  CV: RRR, no M/G/R, heart rate is slightly fast today but asymptomatic  LUNGS: CTAB  ABDOMEN: S&NT, no masses or enlarged organs, no abdominal bruits  EXTREMITY: no edema and full ROM of all joints  NEURO: no focal findings

## 2021-06-16 PROBLEM — K29.70 GASTRITIS: Status: ACTIVE | Noted: 2018-07-11

## 2021-06-16 NOTE — PROGRESS NOTES
Assessment:      Healthy female exam.    1. Well adult exam  Gynecologic Cytology (PAP Smear)   2. Need for vaccination  Td, Adult, PF   3. Benign Essential Hypertension  Basic Metabolic Panel    losartan (COZAAR) 50 MG tablet   4. Familial Hypercholesterolemia  Lipid Cascade    Hepatic Profile   5. Heartburn  ranitidine (ZANTAC) 150 MG tablet   6. Hypercholesterolemia  simvastatin (ZOCOR) 80 MG tablet   7. Essential hypertension, benign  metoprolol succinate (TOPROL-XL) 50 MG 24 hr tablet          Plan:       Check lab as above.   Tetanus booster.  Recommend for the heartburn that she avoid Advil or Aleve, use Tylenol instead, avoid alcohol, pursue weight reduction, avoid spicy foods, prescribed ranitidine 150 mg twice daily #180 that she can use as needed but if not improving certainly follow-up.  Follow-up in 6 months for regular med check.    Subjective:      Sangeeta Valenzuela is a 53 y.o. female who presents for an annual exam. The patient is sexually active. The patient participates in regular exercise: yes. The patient reports that there is not domestic violence in her life.  She has been having frequent heartburn, on a daily basis, does note that it often happens after she eats food.  She does note that spicy foods will aggravate it more.  Alcohol intake is only 3-4 drinks per week.  She does take some occasional Advil or Aleve for occasional headache etc. but not frequently.    Healthy Habits:   Regular Exercise: Yes  Sunscreen Use: Yes  Healthy Diet: Yes  Dental Visits Regularly: Yes  Seat Belt: Yes  Sexually active: Yes  Self Breast Exam Monthly:Yes  Hemoccults: No  Flex Sig: No  Colonoscopy: Yes  Lipid Profile: Yes  Glucose Screen: Yes  Prevention of Osteoporosis: No  Last Dexa: No  Guns at Home:  Yes  Guns Safety Locks:  Yes and Gun safe/class:  Yes      Immunization History   Administered Date(s) Administered     DT (pediatric) 12/15/1997, 01/19/2000     Hep A, historic 02/18/2008, 08/25/2008      Influenza [id number], 2009     Influenza, seasonal,quad inj 36+ mos 2017     Td,adult,historic,unspecified 2008     Tdap 2008     Immunization status: up to date and documented, due today.    No exam data present    Gynecologic History  Patient's last menstrual period was 2018.  Contraception: vasectomy  Last Pap: 3/4/2015. Results were: normal  Last mammogram: 3/7/2017. Results were: normal      OB History    Para Term  AB Living   2 2 2      SAB TAB Ectopic Multiple Live Births             # Outcome Date GA Lbr Cristobal/2nd Weight Sex Delivery Anes PTL Lv   2 Term            1 Term                   Current Outpatient Prescriptions   Medication Sig Dispense Refill     cholecalciferol, vitamin D3, (VITAMIN D3) 2,000 unit Tab Take 2 tablets by mouth daily.       losartan (COZAAR) 50 MG tablet Take 1 tablet (50 mg total) by mouth daily. 90 tablet 1     metoprolol succinate (TOPROL-XL) 50 MG 24 hr tablet Take 1 tablet (50 mg total) by mouth daily. 90 tablet 1     simvastatin (ZOCOR) 80 MG tablet Take 1 tablet (80 mg total) by mouth every evening. 90 tablet 1     No current facility-administered medications for this visit.      Past Medical History:   Diagnosis Date     Hypertension      Past Surgical History:   Procedure Laterality Date     ORIF FIBULA FRACTURE Right 2013     Review of patient's allergies indicates no known allergies.  Family History   Problem Relation Age of Onset     Heart disease Father      Heart attack Father      Breast cancer Maternal Grandmother 57     Breast cancer Maternal Aunt      Approx age in 60's     No Medical Problems Sister      Cancer Brother 68     colon     Cancer Brother 63     colon     No Medical Problems Sister      No Medical Problems Brother      No Medical Problems Brother      Social History     Social History     Marital status:      Spouse name: N/A     Number of children: N/A     Years of education: N/A     Occupational  "History     Not on file.     Social History Main Topics     Smoking status: Never Smoker     Smokeless tobacco: Never Used     Alcohol use 2.4 oz/week     4 Glasses of wine per week     Drug use: No     Sexual activity: Yes     Partners: Male     Birth control/ protection: Surgical     Other Topics Concern     Not on file     Social History Narrative       Review of Systems  Review of Systems    All other review of systems are negative.      Objective:         Vitals:    03/14/18 0839   Weight: 180 lb (81.6 kg)   Height: 5' 5\" (1.651 m)     Body mass index is 29.95 kg/(m^2).    Physical  Physical Exam     Alert female.  External ears and TMs normal.  Eyes unremarkable with pupils equal round and react to light accommodation.  Nose no pharynx negative.  Neck supple without adenopathy or thyromegaly.  Lungs clear.  Heart regular rate and rhythm without murmur.  Impression no masses tenderness or discharge.  Abdomen shows no masses tenderness or hepatosplenomegaly.  Pelvic exam shows normal external genitalia, BUS and vagina normal cervix.  ThinPrep Pap done.  Mild friability with the endocervical swab.  Bimanual exam negative and rectovaginal exam confirms.  Extremities show no edema.  Good peripheral pulses.  Skin is not remarkable.  She is alert with clear speech.  "

## 2021-06-17 NOTE — TELEPHONE ENCOUNTER
Telephone Encounter by Joya Howard LPN at 4/29/2020 10:10 AM     Author: Joya Howard LPN Service: -- Author Type: Licensed Nurse    Filed: 4/29/2020 10:12 AM Encounter Date: 4/29/2020 Status: Signed    : Joya Howard LPN (Licensed Nurse)       Patient Returning Call  Reason for call:  Patient returning call  Information relayed to patient:  Rita Palacios, [facility]           4/29/20 8:51 AM   Note      Left a message for the patient. Is she wanting the [facility] that [facility]? Or just wanting a small supply sent to [facility] to get her through until she receives the order from [facility]?      Patient has additional questions:  No  If YES, what are your questions/concerns:  Patient requested to send full refill Prescription to [facility] # [id number] .   Okay to leave a detailed message?: No

## 2021-06-17 NOTE — TELEPHONE ENCOUNTER
"Caller is \"Kathrine\" -> representative with [facility].  Ref #     Needs new transmittals for losartan and metoprolol because previous scripts had been authorized by Dr Eamon Tamayo who has since retired.  Fresh scripts are simply needed with a current provider name.  Therefore see separate med refill encounter created for this purpose.    Sofi Parker RN  [facility] Triage     Reason for Disposition    Pharmacy calling with prescription question and triager answers question    Protocols used: MEDICATION QUESTION CALL-A-[place]      "

## 2021-06-17 NOTE — PROGRESS NOTES
Assessment:  1.  Right-sided headache, persisting.  2.  Hypertension, not controlled whether due to the above pain or not.    Plan: Schedule MRI of the brain including [place] with and without contrast.  Increase losartan dose to 100 mg-1 p.o. daily #90 refillable ×1 sent to her mail order.  Did try sending the famotidine to the local pharmacy but explained that it is probably not being covered because it is available over-the-counter.  Then she is to follow-up for visit after the scan is done for recheck on her blood pressure etc.  She understands and agrees.  She will call in the meantime if any other difficulties.    Subjective: 53-year-old female presenting for evaluation of headache that started 2 weeks ago on the afternoon of March 15, she notes that initially it seemed more like her normal headache but it has persisted and has not gone away despite taking any Tylenol or Advil etc.  The headache remains on the right side of the head more posteriorly and it does increase with activity.  She has had no fever nausea or vomiting.  She has had no ringing in the ears or trouble with hearing or vision, no sore throat, no cough or any other pain or any focal neurologic symptoms.  But she has never had a headache like this before.  The intensity is 5-7 out of 10 level and again it does get worse with activity.  Past Medical History:   Diagnosis Date     Hypertension      No Known Allergies  Current Outpatient Prescriptions   Medication Sig Dispense Refill     cholecalciferol, vitamin D3, (VITAMIN D3) 2,000 unit Tab Take 2 tablets by mouth daily.       metoprolol succinate (TOPROL-XL) 50 MG 24 hr tablet Take 1 tablet (50 mg total) by mouth daily. 90 tablet 1     simvastatin (ZOCOR) 80 MG tablet Take 1 tablet (80 mg total) by mouth every evening. 90 tablet 1     famotidine (PEPCID) 20 MG tablet Take 1 tablet (20 mg total) by mouth 2 (two) times a day. 180 tablet 0     losartan (COZAAR) 100 MG tablet Take 1  tablet (100 mg total) by mouth daily. 90 tablet 1     No current facility-administered medications for this visit.      Note that the above list of medications is after the increase of the dose of the losartan from  mg today.  All other review of systems are negative.    Objective:/88 (Patient Site: Left Arm, Patient Position: Sitting, Cuff Size: Adult Large)  Pulse 80  Wt 182 lb (82.6 kg)  LMP 03/20/2018  Breastfeeding? No  BMI 30.29 kg/m2  HEENT examination is negative.  External ears and TMs normal.  Eyes show pupils equal round and react to light accommodation.  Nose no Kike's negative.  Neck supple without adenopathy or thyromegaly.  Examination of the right scalp does not show any skin lesions.  Lungs clear.  Heart regular rate and rhythm without murmur.  Abdomen negative.  No pedal edema.  Neurologic examination shows renal nerves II through XII are intact, Romberg test is negative, finger to nose test is normal bilateral, she has normal gait, normal speech.

## 2021-06-18 NOTE — PATIENT INSTRUCTIONS - HE
Patient Instructions by Matteo Constantino MD at 8/31/2020  9:00 AM     Author: Matteo Constantino MD Service: -- Author Type: Physician    Filed: 8/31/2020  9:00 AM Encounter Date: 8/31/2020 Status: Signed    : Matteo Constantino MD (Physician)           Preventing Skin Cancer     Use sunscreen of SPF 30 or greater. Apply liberally.   Relaxing in the sun may feel good. But it isnt good for your skin. In fact, the suns harmful rays are the major cause of skin cancer. This is a serious disease that can be life-threatening. People of all ages, races, and backgrounds are at risk.  Skin cancer is the most common cancer in the [place] But in most cases, it can be prevented.  Your role in prevention  You can act today to help prevent skin cancer. Start by avoiding the suns UV (ultraviolet) rays. And dont use tanning beds or lamps. They are no safer than the sun. Taking these steps can help keep you from getting skin cancer. It can also help prevent wrinkles and other aging effects caused by the sun. Make sure your children also follow these safeguards. Now is the time to start taking steps to prevent skin cancer.  When you are outdoors  Protect your skin when you go out during the day. Take safety steps whenever you go out to eat, run errands by car or on foot, or do any outdoor activity. There isnt just one easy way to protect your skin. Its best to follow all of these steps:    Wear tightly woven clothing that covers your skin. Put on a wide-brimmed hat to protect your face, ears, and scalp.    Watch the clock. Try to stay out of the sun between 10 a.m. and 4 p.m. That's when the sun's rays are strongest.    Head for the shade or create your own. Use an umbrella when sitting or strolling.    Know that the suns rays can reflect off sand, water, and snow. This can harm your skin. Take extra care when you are near reflective surfaces.    Keep in mind that even when the weather is hazy or cloudy, your skin can be exposed to  "strong UV rays.    Shield your skin with sunscreen. Also use sunscreen on your childrens skin. Keep babies younger than 6 months old out of the sun.  Tips for using sunscreen  To help prevent skin cancer, choose the right sunscreen and use it correctly. Try these tips:    Choose a sunscreen that has an SPF (sun protection factor) of at least 30. Also choose a sunscreen labeled \"broad spectrum. This will protect you from both UVA and UVB (ultraviolet A and B) rays.    If one brand irritates your skin, try another, such as one without fragrance.    Use a water-resistant sunscreen if you swim or sweat.    Use at least 1 ounce of sunscreen to cover exposed areas. This is enough to fill a shot glass. You might need to adjust the amount depending on your body size.    Put the sunscreen on dry skin about 15 minutes before going outdoors. This gives it time to soak in.    Reapply sunscreen every 2 hours. If youre active, do this more often.    Cover any sun-exposed skin, from your face to your feet. Dont forget your scalp, ears, and lips.    Know that while sunscreen helps protect you, it isnt enough. Sunscreens extend the length of time you can be outdoors before your skin starts to get red. But they don't give you total protection. Using sunscreen doesn't mean you can stay out in the sun for an unlimited time. Your skin cells are still being damaged. You should also wear protective clothing. And try to stay out of the sun as much as you can, especially from 10 a.m. to 4 p.m.  Date Last Reviewed: 7/1/2019 2000-2019 [employer]. [address] [place] [address], [place] [address]. All rights reserved. This information is not intended as a substitute for professional medical care. Always follow your healthcare professional's instructions.             "

## 2021-06-19 NOTE — LETTER
Letter by Geovanny Tamayo MD at      Author: Geovanny Tamayo MD Service: -- Author Type: --    Filed:  Encounter Date: 3/22/2019 Status: (Other)         Sangeeta Valenzuela  [address] [place] [address]             March 22, 2019         Dear Ms. Valenzuela,    Below are the results from your recent visit:    Resulted Orders   Lipid Cascade   Result Value Ref Range    Cholesterol 190 <=199 mg/dL    Triglycerides 166 (H) <=149 mg/dL    HDL Cholesterol 48 (L) >=50 mg/dL    LDL Calculated 109 <=129 mg/dL    Patient Fasting > 8hrs? Yes    Basic Metabolic Panel   Result Value Ref Range    Sodium 139 136 - 145 mmol/L    Potassium 4.1 3.5 - 5.0 mmol/L    Chloride 106 98 - 107 mmol/L    CO2 21 (L) 22 - 31 mmol/L    Anion Gap, Calculation 12 5 - 18 mmol/L    Glucose 103 70 - 125 mg/dL    Calcium 9.6 8.5 - 10.5 mg/dL    BUN 12 8 - 22 mg/dL    Creatinine 0.73 0.60 - 1.10 mg/dL    GFR MDRD Af Amer >60 >60 mL/min/1.73m2    GFR MDRD Non Af Amer >60 >60 mL/min/1.73m2    Narrative    Fasting Glucose reference range is 70-99 mg/dL per  American Diabetes Association (ADA) guidelines.   Hepatic Profile   Result Value Ref Range    Bilirubin, Total 0.3 0.0 - 1.0 mg/dL    Bilirubin, Direct 0.1 <=0.5 mg/dL    Protein, Total 7.4 6.0 - 8.0 g/dL    Albumin 4.3 3.5 - 5.0 g/dL    Alkaline Phosphatase 66 45 - 120 U/L    AST 12 0 - 40 U/L    ALT 13 0 - 45 U/L        Lipids well controlled, no significant abnormalities on liver and kidney function tests.     Please call with questions or contact us using [facility].    Sincerely,        Electronically signed by Geovanny Tamayo MD

## 2021-06-20 NOTE — PROGRESS NOTES
Assessment:  1.  Hypertension, controlled.  2.  Hyperlipidemia, checking status.  3.  Gastritis controlled with current regimen.    Plan: Check fasting lipid hepatic and basic profiles.  Recommend she try cutting back the famotidine to 1 pill daily.  Continue to minimize alcohol spicy foods etc.  Follow-up in 6 months but earlier as needed.  Refills on meds as needed.  Continue excellent exercise efforts and diet.    Subjective: 53-year-old female presenting for follow-up on the above.  Regarding hypertension no headaches dizziness or leg swelling.  Regarding lipids no diarrhea constipation muscle aching or muscle weakness.  He notes that on this regimen she has not been having trouble with the heartburn.  But she does continue to take the famotidine twice daily.  She does not smoke.  She does have an average of 4 drinks of wine per week.  Past Medical History:   Diagnosis Date     Hypertension      No Known Allergies  Current Outpatient Prescriptions   Medication Sig Dispense Refill     cholecalciferol, vitamin D3, (VITAMIN D3) 2,000 unit Tab Take 2 tablets by mouth daily.       famotidine (PEPCID) 20 MG tablet TAKE ONE TABLET BY MOUTH TWICE DAILY  180 tablet 2     losartan (COZAAR) 100 MG tablet Take 1 tablet (100 mg total) by mouth daily. 90 tablet 1     metoprolol succinate (TOPROL-XL) 50 MG 24 hr tablet Take 1 tablet (50 mg total) by mouth daily. 90 tablet 1     simvastatin (ZOCOR) 80 MG tablet Take 1 tablet (80 mg total) by mouth every evening. 90 tablet 1     No current facility-administered medications for this visit.      All other review of systems currently negative.    Objective:/84  Pulse 74  Wt 181 lb (82.1 kg)  LMP 09/01/2018  Breastfeeding? No  BMI 30.12 kg/m2  HEENT examination negative.  Neck supple without adenopathy or thyromegaly.  Lungs clear.  Heart regular rate and rhythm without murmur.  Abdomen shows no masses tenderness or hepatosplenomegaly.  No pedal edema.  She is alert with  clear speech.

## 2021-06-25 NOTE — PROGRESS NOTES
Assessment:      Healthy female exam.    1. Well adult exam     2. Benign Essential Hypertension  Basic Metabolic Panel   3. Familial Hypercholesterolemia  Lipid Cascade    Hepatic Profile          Plan:       Check lab as above. Continue regular exercising and healthy diet.  Continue current medication regimen.  Follow-up in 6 months for next med check.  She is current on her colonoscopy screening.    Subjective:      Sangeeta Valenzuela is a 54 y.o. female who presents for an annual exam. The patient is sexually active. The patient participates in regular exercise: yes. The patient reports that there is not domestic violence in her life. Generally feeling well.    Healthy Habits:   Regular Exercise: Yes  Sunscreen Use: Yes  Healthy Diet: Yes  Dental Visits Regularly: Yes  Seat Belt: Yes  Sexually active: Yes  Self Breast Exam Monthly:Yes  Hemoccults: No  Flex Sig: No  Colonoscopy: Yes  Lipid Profile: Yes  Glucose Screen: Yes  Prevention of Osteoporosis: No  Last Dexa: No  Guns at Home:  Yes  Guns Safety Locks:  Yes      Immunization History   Administered Date(s) Administered     DT (pediatric) 12/15/1997, 2000     Hep A, historic 2008, 2008     Influenza [id number], 2009     Influenza, seasonal,quad inj 36+ mos 2017     Td, adult adsorbed, PF 2018     Td,adult,historic,unspecified 2008     Tdap 2008     Immunization status: up to date and documented.    No exam data present    Gynecologic History  Patient's last menstrual period was 2019.  Contraception: none  Last Pap: 3/14/2018. Results were: normal including negative HPV.  Last mammogram: 2018. Results were: normal      OB History    Para Term  AB Living   2 2 2         SAB TAB Ectopic Multiple Live Births                  # Outcome Date GA Lbr Cristobal/2nd Weight Sex Delivery Anes PTL Lv   2 Term            1 Term                   Current Outpatient Medications   Medication Sig Dispense  Refill     cholecalciferol, vitamin D3, (VITAMIN D3) 2,000 unit Tab Take 2 tablets by mouth daily.       famotidine (PEPCID) 20 MG tablet TAKE ONE TABLET BY MOUTH TWICE DAILY  180 tablet 2     losartan (COZAAR) 100 MG tablet Take 1 tablet (100 mg total) by mouth daily. 90 tablet 3     metoprolol succinate (TOPROL-XL) 50 MG 24 hr tablet Take 1 tablet (50 mg total) by mouth daily. 90 tablet 3     simvastatin (ZOCOR) 80 MG tablet Take 1 tablet (80 mg total) by mouth every evening. 90 tablet 1     No current facility-administered medications for this visit.      Past Medical History:   Diagnosis Date     Hypertension      Past Surgical History:   Procedure Laterality Date     ORIF FIBULA FRACTURE Right 12/13/2013     Patient has no known allergies.  Family History   Problem Relation Age of Onset     Heart disease Father      Heart attack Father      Breast cancer Maternal Grandmother 57     Breast cancer Maternal Aunt         Approx age in 60's     Cancer Brother 68        colon     Cancer Brother 63        colon     Social History     Socioeconomic History     Marital status:      Spouse name: Not on file     Number of children: Not on file     Years of education: Not on file     Highest education level: Not on file   Occupational History     Not on file   Social Needs     Financial resource strain: Not on file     Food insecurity:     Worry: Not on file     Inability: Not on file     Transportation needs:     Medical: Not on file     Non-medical: Not on file   Tobacco Use     Smoking status: Never Smoker     Smokeless tobacco: Never Used   Substance and Sexual Activity     Alcohol use: Yes     Alcohol/week: 2.4 oz     Types: 4 Glasses of wine per week     Drug use: No     Sexual activity: Yes     Partners: Male     Birth control/protection: Surgical   Lifestyle     Physical activity:     Days per week: Not on file     Minutes per session: Not on file     Stress: Not on file   Relationships     Social  "connections:     Talks on phone: Not on file     Gets together: Not on file     Attends Islam service: Not on file     Active member of club or organization: Not on file     Attends meetings of clubs or organizations: Not on file     Relationship status: Not on file     Intimate partner violence:     Fear of current or ex partner: Not on file     Emotionally abused: Not on file     Physically abused: Not on file     Forced sexual activity: Not on file   Other Topics Concern     Not on file   Social History Narrative     Not on file       Review of Systems  Review of Systems   All other review of systems are negative.          Objective:         Vitals:    03/21/19 0847 03/21/19 0852   BP: 130/90 138/80   Pulse: 82    Resp: 20    Temp: 98.1  F (36.7  C)    TempSrc: Oral    SpO2: 99%    Weight: 182 lb (82.6 kg)    Height: 5' 5\" (1.651 m)      Body mass index is 30.29 kg/m .    Physical  Physical Exam   Head normocephalic.  External ears and TMs normal.  Eyes show pupils equal round react light accommodation.  Nose no del rio negative.  Neck supple without adenopathy or thyromegaly.  Lungs clear to auscultation.  Heart regular rate and rhythm without murmur.  Breasts no masses tenderness or discharge.  Abdomen shows no masses tenderness or hepatosplenomegaly.  No cervical axillary or groin adenopathy.  Pelvic examination shows normal external genitalia BUS and vagina, normal cervix, bimanual exam negative and rectovaginal exam confirms.  Extremities show no edema.  2+ dorsalis pedis pulses bilateral.  Skin is not remarkable.  She is alert with clear speech.       "

## 2021-06-27 ENCOUNTER — HEALTH MAINTENANCE LETTER (OUTPATIENT)
Age: 56
End: 2021-06-27

## 2021-07-04 NOTE — PROGRESS NOTES
Progress Notes by Yamileth Crespo MA at 3/16/2021 10:30 AM     Author: Yamileth Crespo MA Service: -- Author Type: Medical Assistant    Filed: 7/1/2021 12:15 PM Encounter Date: 3/16/2021 Status: Signed    : Yamileth Crespo MA (Medical Assistant)       I met with Sangeeta Valenzuela at the request of Dr. Constantino  to recheck her blood pressure.  Blood pressure medications on the MAR were reviewed with patient.    Patient has taken all medications as per usual regimen: Yes  Patient reports tolerating them without any issues or concerns: Yes    Vitals:    03/16/21 1026   BP: 126/86   Patient Position: Sitting   Cuff Size: Adult Large   Pulse: (!) 113   SpO2: 100%       Blood pressure was taken, previous encounter was reviewed, recorded blood pressure below 140/90.  Patient was discharged and the note will be sent to the provider for final review.

## 2021-07-04 NOTE — ADDENDUM NOTE
Addendum Note by Matteo Constantino MD at 3/1/2021  7:40 AM     Author: Matteo Constantino MD Service: -- Author Type: Physician    Filed: 3/2/2021  8:37 AM Encounter Date: 3/1/2021 Status: Signed    : Matteo Constantino MD (Physician)    Addended by: MATTEO CONSTANTINO on: 3/2/2021 08:37 AM        Modules accepted: Orders

## 2021-07-06 ENCOUNTER — COMMUNICATION - HEALTHEAST (OUTPATIENT)
Dept: FAMILY MEDICINE | Facility: CLINIC | Age: 56
End: 2021-07-06

## 2021-07-06 DIAGNOSIS — K29.70 GASTRITIS: ICD-10-CM

## 2021-07-06 RX ORDER — FAMOTIDINE 20 MG/1
20 TABLET, FILM COATED ORAL 2 TIMES DAILY PRN
Qty: 180 TABLET | Refills: 2 | Status: SHIPPED | OUTPATIENT
Start: 2021-07-06 | End: 2021-09-02

## 2021-07-06 NOTE — TELEPHONE ENCOUNTER
Telephone Encounter by Homero Viera, RN at 7/6/2021  9:42 AM     Author: Homero Viera, RN Service: -- Author Type: Registered Nurse    Filed: 7/6/2021  9:43 AM Encounter Date: 7/6/2021 Status: Signed    : Homero Viera, RN (Registered Nurse)       Refill Approved    Rx renewed per Medication Renewal Policy. Medication was last renewed on 9/13/20.    Homero Viera, [facility] Connection Triage/Med Refill 7/6/2021     Requested Prescriptions   Pending Prescriptions Disp Refills   ? famotidine (PEPCID) 20 MG tablet [Pharmacy Med Name: Famotidine Oral Tablet 20 MG] 135 tablet 0     Sig: Take 1 tablet (20 mg total) by mouth 2 (two) times a day as needed for heartburn.       GI Medications Refill Protocol Passed - 7/6/2021  7:44 AM        Passed - PCP or prescribing provider visit in last 12 or next 3 months.     Last office visit with prescriber/PCP: 3/1/2021 Matteo Constantino MD OR same dept: 3/1/2021 Matteo Constantino MD OR same specialty: 3/1/2021 Matteo Constantino MD  Last physical: 8/31/2020 Last MTM visit: Visit date not found   Next visit within 3 mo: Visit date not found  Next physical within 3 mo: Visit date not found  Prescriber OR PCP: Matteo Constantino MD  Last diagnosis associated with med order: 1. Gastritis  - famotidine (PEPCID) 20 MG tablet [Pharmacy Med Name: Famotidine Oral Tablet 20 MG]; Take 1 tablet (20 mg total) by mouth 2 (two) times a day as needed for heartburn.  Dispense: 135 tablet; Refill: 0    If protocol passes may refill for 12 months if within 3 months of last provider visit (or a total of 15 months).

## 2021-07-13 ENCOUNTER — RECORDS - HEALTHEAST (OUTPATIENT)
Dept: ADMINISTRATIVE | Facility: CLINIC | Age: 56
End: 2021-07-13

## 2021-07-21 ENCOUNTER — RECORDS - HEALTHEAST (OUTPATIENT)
Dept: ADMINISTRATIVE | Facility: CLINIC | Age: 56
End: 2021-07-21

## 2021-09-02 ENCOUNTER — OFFICE VISIT (OUTPATIENT)
Dept: FAMILY MEDICINE | Facility: CLINIC | Age: 56
End: 2021-09-02
Payer: COMMERCIAL

## 2021-09-02 VITALS
HEIGHT: 65 IN | WEIGHT: 166 LBS | SYSTOLIC BLOOD PRESSURE: 128 MMHG | BODY MASS INDEX: 27.66 KG/M2 | HEART RATE: 80 BPM | DIASTOLIC BLOOD PRESSURE: 82 MMHG | OXYGEN SATURATION: 100 %

## 2021-09-02 DIAGNOSIS — I10 ESSENTIAL HYPERTENSION, BENIGN: ICD-10-CM

## 2021-09-02 DIAGNOSIS — E78.00 PURE HYPERCHOLESTEROLEMIA: Primary | ICD-10-CM

## 2021-09-02 DIAGNOSIS — Z11.59 ENCOUNTER FOR HEPATITIS C SCREENING TEST FOR LOW RISK PATIENT: ICD-10-CM

## 2021-09-02 DIAGNOSIS — K29.70 GASTRITIS WITHOUT BLEEDING, UNSPECIFIED CHRONICITY, UNSPECIFIED GASTRITIS TYPE: ICD-10-CM

## 2021-09-02 DIAGNOSIS — N95.1 PERIMENOPAUSAL: ICD-10-CM

## 2021-09-02 LAB
ALBUMIN SERPL-MCNC: 4.3 G/DL (ref 3.5–5)
ALP SERPL-CCNC: 101 U/L (ref 45–120)
ALT SERPL W P-5'-P-CCNC: 26 U/L (ref 0–45)
ANION GAP SERPL CALCULATED.3IONS-SCNC: 13 MMOL/L (ref 5–18)
AST SERPL W P-5'-P-CCNC: 20 U/L (ref 0–40)
BILIRUB SERPL-MCNC: 0.6 MG/DL (ref 0–1)
BUN SERPL-MCNC: 7 MG/DL (ref 8–22)
CALCIUM SERPL-MCNC: 9.8 MG/DL (ref 8.5–10.5)
CHLORIDE BLD-SCNC: 104 MMOL/L (ref 98–107)
CHOLEST SERPL-MCNC: 185 MG/DL
CO2 SERPL-SCNC: 24 MMOL/L (ref 22–31)
CREAT SERPL-MCNC: 0.72 MG/DL (ref 0.6–1.1)
FASTING STATUS PATIENT QL REPORTED: YES
GFR SERPL CREATININE-BSD FRML MDRD: >90 ML/MIN/1.73M2
GLUCOSE BLD-MCNC: 125 MG/DL (ref 70–125)
HDLC SERPL-MCNC: 42 MG/DL
LDLC SERPL CALC-MCNC: 115 MG/DL
POTASSIUM BLD-SCNC: 4.1 MMOL/L (ref 3.5–5)
PROT SERPL-MCNC: 7.2 G/DL (ref 6–8)
SODIUM SERPL-SCNC: 141 MMOL/L (ref 136–145)
TRIGL SERPL-MCNC: 139 MG/DL

## 2021-09-02 PROCEDURE — 80061 LIPID PANEL: CPT | Performed by: FAMILY MEDICINE

## 2021-09-02 PROCEDURE — 86803 HEPATITIS C AB TEST: CPT | Performed by: FAMILY MEDICINE

## 2021-09-02 PROCEDURE — 80053 COMPREHEN METABOLIC PANEL: CPT | Performed by: FAMILY MEDICINE

## 2021-09-02 PROCEDURE — 36415 COLL VENOUS BLD VENIPUNCTURE: CPT | Performed by: FAMILY MEDICINE

## 2021-09-02 PROCEDURE — 99214 OFFICE O/P EST MOD 30 MIN: CPT | Performed by: FAMILY MEDICINE

## 2021-09-02 ASSESSMENT — MIFFLIN-ST. JEOR: SCORE: 1348.85

## 2021-09-03 LAB — HCV AB SERPL QL IA: NEGATIVE

## 2021-09-04 NOTE — PROGRESS NOTES
ASSESSMENT/PLAN:       1. Pure hypercholesterolemia     - Lipid panel reflex to direct LDL Fasting, 185/139/42/115  Note that 6 months ago the patient's triglycerides were 261 so significant improvement with her weight loss.  Continue on atorvastatin 40 mg daily    2. Essential hypertension, benign     - Comprehensive metabolic panel, note of the patient's blood sugar is 125 and I believe that is fasting.  Electrolytes are normal including calcium.  Kidney and liver function are normal.    3. Perimenopausal  Last menstrual period 5/1/2021 and cycles are stretching out and when she has had a period this year is not been excessively heavy or painful.  No hot flashes.    4. Encounter for hepatitis C screening test for low risk patient     - Hepatitis C antibody, negative    5. Gastritis without bleeding, unspecified chronicity, unspecified gastritis type  Okay to use famotidine on a as needed basis    Test results via [employer]  Follow-up 6 months of health care visit  Level of medical decision making moderate   To or more chronic stable conditions were reviewed  Amount of data reviewed included 3 unique tests that were ordered, reviewed and managed  Risk of complications moderate requiring prescription drug management      Health Maintenance Due   Topic     Flu Vaccine (1)       Matteo Constantino MD      PROGRESS NOTE   9/4/2021    SUBJECTIVE:  [id number]  who presents for   Chief Complaint   Patient presents with     Recheck Medication     FASTING LABS    The patient is seen today for a follow-up on her medications.  In March 2021 the statin was changed from simvastatin to atorvastatin because her triglycerides were high.  She has been tolerating the atorvastatin without difficulty.  The patient has had success of losing 18 pounds this year.    Hypertension seems to be controlled and he is treated with losartan 100 mg daily  Metoprolol XL 50 mg daily.    The patient is perimenopausal with a last period of 5/1/2021.   no hot  flashes.  The patient will be due for mammogram November 2021.  Note that she has fairly dense breast tissue.    History of gastritis and using famotidine only occasionally.  Not using any nonsteroidal anti-inflammatory medications or aspirin.      Patient Active Problem List   Diagnosis     Vitamin D Deficiency     Familial Hypercholesterolemia     Benign Essential Hypertension     FHx: colon cancer     Gastritis       Current Outpatient Medications   Medication Sig Dispense Refill     atorvastatin (LIPITOR) 40 MG tablet [ATORVASTATIN (LIPITOR) 40 MG TABLET] Take 1 tablet (40 mg total) by mouth at bedtime. 90 tablet 3     cholecalciferol, vitamin D3, (VITAMIN D3) 2,000 unit Tab [CHOLECALCIFEROL, VITAMIN D3, (VITAMIN D3) 2,000 UNIT TAB] Take 2 tablets by mouth daily.       famotidine (PEPCID) 20 MG tablet [FAMOTIDINE (PEPCID) 20 MG TABLET] Take 1 tablet (20 mg total) by mouth 2 (two) times a day as needed for heartburn. 135 tablet 3     losartan (COZAAR) 100 MG tablet [LOSARTAN (COZAAR) 100 MG TABLET] TAKE 1 TABLET DAILY 90 tablet 1     metoprolol succinate (TOPROL-XL) 50 MG 24 hr tablet [METOPROLOL SUCCINATE (TOPROL-XL) 50 MG 24 HR TABLET] TAKE 1 TABLET DAILY 90 tablet 1       History   Smoking Status     Never Smoker   Smokeless Tobacco     Never Used           OBJECTIVE:      Recent Results (from the past 120 hour(s))   Lipid panel reflex to direct LDL Fasting    Collection Time: 09/02/21  7:29 AM   Result Value Ref Range    Cholesterol 185 <=199 mg/dL    Triglycerides 139 <=149 mg/dL    Direct Measure HDL 42 (L) >=50 mg/dL    LDL Cholesterol Calculated 115 <=129 mg/dL    Patient Fasting > 8hrs? Yes    Hepatitis C antibody    Collection Time: 09/02/21  7:29 AM   Result Value Ref Range    Hepatitis C Antibody Negative Negative   Comprehensive metabolic panel    Collection Time: 09/02/21  7:29 AM   Result Value Ref Range    Sodium 141 136 - 145 mmol/L    Potassium 4.1 3.5 - 5.0 mmol/L    Chloride 104 98 - 107  "mmol/L    Carbon Dioxide (CO2) 24 22 - 31 mmol/L    Anion Gap 13 5 - 18 mmol/L    Urea Nitrogen 7 (L) 8 - 22 mg/dL    Creatinine 0.72 0.60 - 1.10 mg/dL    Calcium 9.8 8.5 - 10.5 mg/dL    Glucose 125 70 - 125 mg/dL    Alkaline Phosphatase 101 45 - 120 U/L    AST 20 0 - 40 U/L    ALT 26 0 - 45 U/L    Protein Total 7.2 6.0 - 8.0 g/dL    Albumin 4.3 3.5 - 5.0 g/dL    Bilirubin Total 0.6 0.0 - 1.0 mg/dL    GFR Estimate >90 >60 mL/min/1.73m2       Vitals:    09/02/21 0657   BP: 128/82   Pulse: 80   SpO2: 100%   Weight: 75.3 kg (166 lb)   Height: 1.651 m (5' 5\")     Wt Readings from Last 3 Encounters:   09/02/21 75.3 kg (166 lb)   03/01/21 81.6 kg (179 lb 12.8 oz)   08/31/20 83.6 kg (184 lb 6 oz)   .        Physical Exam:  GENERAL APPEARANCE: Pleasant 56-year-old female who works for [employer] from home since COVID-19 pandemic, NAD, well hydrated, well nourished  SKIN:  Normal skin turgor, no lesions/rashes   HEENT: moist mucous membranes, no rhinorrhea  NECK: Normal without adenopathy or masses, no carotid bruits  CV: RRR, no M/G/R   LUNGS: CTAB  ABDOMEN: S&NT, no masses or enlarged organs, no abdominal bruits  EXTREMITY: no edema and full ROM of all joints  NEURO: no focal findings    "

## 2021-09-04 NOTE — RESULT ENCOUNTER NOTE
Sangeeta,  It was nice to have the opportunity to see you in the clinic this past week.  Your test results are back and I would like to review those results.We talked about doing the screening test for hepatitis C which was done and came back as being negative.  That is what we like to see.  The lipid panel shows that your total cholesterol is 185 which is satisfactory.  Your HDL cholesterol is the good cholesterol and 42 is a bit on the low side.  Regular cardio exercise will help the HDL cholesterol.  Triglycerides have improved significantly from 261  6 months ago down to 139.  That is a reflection of your success with weight loss and also switching to atorvastatin.    The metabolic show that your electrolytes including calcium were normal.  Your blood glucose was 125 which is in the normal range but at the high end of normal.  The BUN, creatinine and GFR look at your kidney function.  Your kidney function is normal.  When the BUN is low that is not of concern.  The rest of the tests look at your liver function all of which are normal.    No changes in medication is needed.  Continue your efforts to maintain or even lose another 5-10 pounds.  Stay as physically active as possible.  I would recommend a influenza vaccine this fall.    We will plan to see you back in the clinic in about 6 months for preventive health care visit along with checking your lipids and your blood pressure.  Remember to get a mammogram in November of this year.    Kind regards,Dr. Constantino

## 2021-10-17 ENCOUNTER — HEALTH MAINTENANCE LETTER (OUTPATIENT)
Age: 56
End: 2021-10-17

## 2021-10-18 DIAGNOSIS — I10 ESSENTIAL HYPERTENSION, BENIGN: ICD-10-CM

## 2021-10-19 RX ORDER — LOSARTAN POTASSIUM 100 MG/1
100 TABLET ORAL DAILY
Qty: 90 TABLET | Refills: 3 | Status: SHIPPED | OUTPATIENT
Start: 2021-10-19 | End: 2022-10-26

## 2021-10-19 NOTE — TELEPHONE ENCOUNTER
"Last Written Prescription Date:  5/3/21  Last Fill Quantity: 90,  # refills: 1   Last office visit provider:  9/2/21     Requested Prescriptions   Pending Prescriptions Disp Refills     losartan (COZAAR) 100 MG tablet [Pharmacy Med Name: LOSARTAN TAB 100MG] 90 tablet 1     Sig: TAKE 1 TABLET DAILY       Angiotensin-II Receptors Passed - 10/18/2021 12:50 PM        Passed - Last blood pressure under 140/90 in past 12 months     BP Readings from Last 3 Encounters:   09/02/21 128/82   03/16/21 126/86   03/01/21 (!) 150/88                 Passed - Recent (12 mo) or future (30 days) visit within the authorizing provider's specialty     Patient has had an office visit with the authorizing provider or a provider within the authorizing providers department within the previous 12 mos or has a future within next 30 days. See \"Patient Info\" tab in inbasket, or \"Choose Columns\" in Meds & Orders section of the refill encounter.              Passed - Medication is active on med list        Passed - Patient is age 18 or older        Passed - No active pregnancy on record        Passed - Normal serum creatinine on file in past 12 months     Recent Labs   Lab Test 09/02/21  0729   CR 0.72       Ok to refill medication if creatinine is low          Passed - Normal serum potassium on file in past 12 months     Recent Labs   Lab Test 09/02/21  0729   POTASSIUM 4.1                    Passed - No positive pregnancy test in past 12 months             Homero Viera RN 10/19/21 1:07 PM  "

## 2021-11-01 DIAGNOSIS — I10 ESSENTIAL HYPERTENSION, BENIGN: ICD-10-CM

## 2021-11-02 RX ORDER — METOPROLOL SUCCINATE 50 MG/1
50 TABLET, EXTENDED RELEASE ORAL DAILY
Qty: 90 TABLET | Refills: 3 | Status: SHIPPED | OUTPATIENT
Start: 2021-11-02 | End: 2022-04-12

## 2021-11-02 NOTE — TELEPHONE ENCOUNTER
"Last Written Prescription Date:  5/3/21  Last Fill Quantity: 90,  # refills: 1   Last office visit provider:  9/2/21     Requested Prescriptions   Pending Prescriptions Disp Refills     metoprolol succinate ER (TOPROL-XL) 50 MG 24 hr tablet [Pharmacy Med Name: METOPROL SUC TAB 50MG ER] 90 tablet 1     Sig: TAKE 1 TABLET DAILY       Beta-Blockers Protocol Passed - 11/1/2021 11:48 AM        Passed - Blood pressure under 140/90 in past 12 months     BP Readings from Last 3 Encounters:   09/02/21 128/82   03/16/21 126/86   03/01/21 (!) 150/88                 Passed - Patient is age 6 or older        Passed - Recent (12 mo) or future (30 days) visit within the authorizing provider's specialty     Patient has had an office visit with the authorizing provider or a provider within the authorizing providers department within the previous 12 mos or has a future within next 30 days. See \"Patient Info\" tab in inbasket, or \"Choose Columns\" in Meds & Orders section of the refill encounter.              Passed - Medication is active on med list             Homero Viera RN 11/02/21 1:52 PM  "

## 2021-12-10 ENCOUNTER — HOSPITAL ENCOUNTER (OUTPATIENT)
Dept: MAMMOGRAPHY | Facility: CLINIC | Age: 56
Discharge: HOME | End: 2021-12-10
Attending: FAMILY MEDICINE | Admitting: FAMILY MEDICINE
Payer: COMMERCIAL

## 2021-12-10 DIAGNOSIS — Z12.31 VISIT FOR SCREENING MAMMOGRAM: ICD-10-CM

## 2021-12-10 PROCEDURE — 77067 SCR MAMMO BI INCL CAD: CPT

## 2022-03-17 DIAGNOSIS — I10 ESSENTIAL HYPERTENSION, BENIGN: ICD-10-CM

## 2022-03-17 RX ORDER — ATORVASTATIN CALCIUM 40 MG/1
40 TABLET, FILM COATED ORAL DAILY
Qty: 90 TABLET | Refills: 3 | OUTPATIENT
Start: 2022-03-17

## 2022-03-17 NOTE — TELEPHONE ENCOUNTER
Refused refill request as there should already be refill on file.   Last filled on 1/9/2022 disp 90 refills 3.  Cyndi Padilla RN   03/17/22 1:19 PM  [facility] Nurse Advisor

## 2022-03-17 NOTE — TELEPHONE ENCOUNTER
Reason for Call:  Medication or medication refill:    Do you use a [facility]?  Name of the pharmacy and phone number for the current request:  [facility] - [place], [place] - [address] AT [facility]     Name of the medication requested: atorvastatin (LIPITOR) 40 MG tablet    Other request: Patient called [facility] pharmacy and was told to contact provider for more refills.     Can we leave a detailed message on this number? YES    Phone number patient can be reached at: Cell number on file:    Telephone Information:   Mobile [phone]       Best Time: any    Call taken on 3/17/2022 at 10:13 AM by Keshia Dial

## 2022-04-11 ENCOUNTER — OFFICE VISIT (OUTPATIENT)
Dept: FAMILY MEDICINE | Facility: CLINIC | Age: 57
End: 2022-04-11
Payer: COMMERCIAL

## 2022-04-11 VITALS
OXYGEN SATURATION: 100 % | DIASTOLIC BLOOD PRESSURE: 94 MMHG | WEIGHT: 156.3 LBS | SYSTOLIC BLOOD PRESSURE: 162 MMHG | HEART RATE: 113 BPM | BODY MASS INDEX: 26.01 KG/M2

## 2022-04-11 DIAGNOSIS — N95.1 PERIMENOPAUSAL: ICD-10-CM

## 2022-04-11 DIAGNOSIS — E78.00 PURE HYPERCHOLESTEROLEMIA: ICD-10-CM

## 2022-04-11 DIAGNOSIS — K29.70 GASTRITIS WITHOUT BLEEDING, UNSPECIFIED CHRONICITY, UNSPECIFIED GASTRITIS TYPE: ICD-10-CM

## 2022-04-11 DIAGNOSIS — I10 ESSENTIAL HYPERTENSION, BENIGN: Primary | ICD-10-CM

## 2022-04-11 DIAGNOSIS — Z00.00 HEALTH CARE MAINTENANCE: ICD-10-CM

## 2022-04-11 DIAGNOSIS — Z80.0 FHX: COLON CANCER: ICD-10-CM

## 2022-04-11 DIAGNOSIS — R00.0 TACHYCARDIA: ICD-10-CM

## 2022-04-11 LAB
ANION GAP SERPL CALCULATED.3IONS-SCNC: 15 MMOL/L (ref 5–18)
BUN SERPL-MCNC: 9 MG/DL (ref 8–22)
CALCIUM SERPL-MCNC: 9 MG/DL (ref 8.5–10.5)
CHLORIDE BLD-SCNC: 103 MMOL/L (ref 98–107)
CO2 SERPL-SCNC: 22 MMOL/L (ref 22–31)
CREAT SERPL-MCNC: 0.7 MG/DL (ref 0.6–1.1)
GFR SERPL CREATININE-BSD FRML MDRD: >90 ML/MIN/1.73M2
GLUCOSE BLD-MCNC: 121 MG/DL (ref 70–125)
POTASSIUM BLD-SCNC: 4 MMOL/L (ref 3.5–5)
SODIUM SERPL-SCNC: 140 MMOL/L (ref 136–145)
TSH SERPL DL<=0.005 MIU/L-ACNC: 1.99 UIU/ML (ref 0.3–5)

## 2022-04-11 PROCEDURE — 80048 BASIC METABOLIC PNL TOTAL CA: CPT | Performed by: FAMILY MEDICINE

## 2022-04-11 PROCEDURE — 84443 ASSAY THYROID STIM HORMONE: CPT | Performed by: FAMILY MEDICINE

## 2022-04-11 PROCEDURE — 99214 OFFICE O/P EST MOD 30 MIN: CPT | Performed by: FAMILY MEDICINE

## 2022-04-11 PROCEDURE — 36415 COLL VENOUS BLD VENIPUNCTURE: CPT | Performed by: FAMILY MEDICINE

## 2022-04-12 RX ORDER — METOPROLOL SUCCINATE 50 MG/1
50 TABLET, EXTENDED RELEASE ORAL AT BEDTIME
Qty: 90 TABLET | Refills: 3
Start: 2022-04-12 | End: 2022-10-26

## 2022-04-12 NOTE — RESULT ENCOUNTER NOTE
Sangeeta,  It was nice to see you in the clinic yesterday.  Your test results are back and I would like to go over those results.    TSH checked your thyroid function and I did that primarily because your blood pressure was elevated and your pulse was fast.  The good news is that your thyroid seems to be functioning normal.  No other testing is needed.    The metabolic panel showed that your electrolytes including calcium are normal.  Your GFR measures your kidney function which was normal.  The blood glucose was 121 which is slightly high but not at the level of diabetes.    I noticed that you have lost about 23 pounds since last seen and that is wonderful.  Keep up the good work!    Send me some blood pressure readings in a couple weeks and I will see if we need to make any changes in your blood pressure medication.    I did not let you know that I am retiring at the end of June this year.  It has been an honor to be able to take care of you even though it has been a short time.    It would be good to follow-up in about 6 months and at that time have a preventative health care visit and you could do your Pap smear that is needed at that time.  We have some options for providers at our clinic that are taking new patients.  I would recommend Dr. Shari Bernal MD, Lashell Hutchinson NP, Brayan VILLARREAL, or Riaz Crespo NP.  I would feel very comfortable with any of these four providers to take care of my family.    Dr. Constantino

## 2022-04-12 NOTE — PROGRESS NOTES
ASSESSMENT/PLAN:       1. Essential hypertension, benign  The patient's blood pressure is not controlled today and I suggested that she check her blood pressure 2 or 3 times a week and after she has 5 or more blood pressure readings send me those readings through [employer].  Her goal is to see the systolic blood pressure be in the 130s or lower and the diastolic blood pressure be in the 80s or lower.  If blood pressures not at goal especially in view of her tachycardia today we feel comfortable increasing the metoprolol to  100 mg daily    - Basic metabolic panel, electrolytes are normal with GFR of greater than 90 and blood glucose 121    Next blood draw include A1c    2. Pure hypercholesterolemia  HDL 42 and  done in September 2021 and will repeat in 6 months  Patient currently is on atorvastatin 40 mg daily    3. FHx: colon cancer  Repeat colonoscopy April 2024 and precautions this time to avoid the risk of aspiration or emesis    4. Gastritis without bleeding, unspecified chronicity, unspecified gastritis type  The patient is using famotidine on a as needed basis and will continue with that.  Has been successful at losing 23 pounds which is great    5. Tachycardia  The patient does not have symptoms with tachycardia today or in the past including no palpitations.  Her heart rate seems regular.    - TSH with free T4 reflex, TSH is 1.99 which is normal    6. Perimenopausal  Last menstrual period 2/10/2022    7. Health care maintenance     - REVIEW OF HEALTH MAINTENANCE PROTOCOL ORDERS    Report to me blood pressure readings in a couple weeks through [employer]  Continue with vitamin D supplementation 2000 international units daily  Next Pap smear March 2023    Test results by [employer]  Follow-up 6 months preventive health care visit  Encouraged the patient to check with her insurance to see if the shingles vaccine is covered    Matteo Constantino MD      PROGRESS NOTE   4/12/2022    SUBJECTIVE:  [id number]  who  presents for   Chief Complaint   Patient presents with     Medication Update     No concerns. Side effects.      57-year-old female who is employed for [employer] in [place] working from home and in the office.    Medication check today and review of her chronic conditions.    History of hypertension with blood pressure being elevated today when my assistant checked her blood pressure as well as when I checked it.  This is not been typical for her over the last year or 2.  She does have the ability to check her blood pressure at home.  She has not checked her blood pressure recently.  She is taking losartan 100 mg daily  Metoprolol XL 50 mg daily  No side effects from her medications    Hyperlipidemia treated with atorvastatin 40 mg daily.  No side effects from the medication.  The patient had lipids checked about 6 months ago and the numbers were 185/139/42/115  We decided not to recheck the lipids today.    Family history of colon cancer with a colonoscopy done April 2021 and the patient had some emesis during the procedure which caused him to have to shorten the procedure because of concern of aspiration.  Thus there withdrawal time was shortened.  There were no polyps seen but is thought that a follow-up colonoscopy should be done in 3 years which would be April 2024.    History of gastritis without bleeding and also acid reflux and she takes famotidine on a as needed basis.  Is following that weight loss and diet modifications has helped her dyspepsia and GERD significantly.  The famotidine that she uses is 20 mg up to twice a day but recently has never had to be twice during the day    Note that the patient has some tachycardia today with her heart rate staying above 100 after 15 minutes of sitting and also during the time I was in the room with her.  Blood pressure elevated as well today.  The patient did not have any caffeine this morning and she did take her medication this morning.    The patient  is perimenopausal with her last period being 10/10/2022.  Her menstrual periods are irregular.  She did have a time of hot flashes but no longer.  When she does have a menstrual period is not terribly symptomatic or heavy.    Patient Active Problem List   Diagnosis     Vitamin D Deficiency     Familial Hypercholesterolemia     Benign Essential Hypertension     FHx: colon cancer     Gastritis       Current Outpatient Medications   Medication Sig Dispense Refill     atorvastatin (LIPITOR) 40 MG tablet Take 1 tablet (40 mg) by mouth daily 90 tablet 3     cholecalciferol, vitamin D3, (VITAMIN D3) 2,000 unit Tab [CHOLECALCIFEROL, VITAMIN D3, (VITAMIN D3) 2,000 UNIT TAB] Take 2 tablets by mouth daily.       famotidine (PEPCID) 20 MG tablet [FAMOTIDINE (PEPCID) 20 MG TABLET] Take 1 tablet (20 mg total) by mouth 2 (two) times a day as needed for heartburn. 135 tablet 3     losartan (COZAAR) 100 MG tablet Take 1 tablet (100 mg) by mouth daily 90 tablet 3     metoprolol succinate ER (TOPROL-XL) 50 MG 24 hr tablet Take 1 tablet (50 mg) by mouth daily 90 tablet 3       History   Smoking Status     Never Smoker   Smokeless Tobacco     Never Used       ROS:  Answers for HPI/ROS submitted by the patient on 4/4/2022  How many servings of fruits and vegetables do you eat daily?: 2-3  On average, how many sweetened beverages do you drink each day (Examples: soda, juice, sweet tea, etc.  Do NOT count diet or artificially sweetened beverages)?: 1  How many minutes a day do you exercise enough to make your heart beat faster?: 10 to 19  How many days a week do you exercise enough to make your heart beat faster?: 4  How many days per week do you miss taking your medication?: 0  What is the reason for your visit today?: Med check        OBJECTIVE:      Recent Results (from the past 48 hour(s))   Basic metabolic panel    Collection Time: 04/11/22 10:22 AM   Result Value Ref Range    Sodium 140 136 - 145 mmol/L    Potassium 4.0 3.5 - 5.0  mmol/L    Chloride 103 98 - 107 mmol/L    Carbon Dioxide (CO2) 22 22 - 31 mmol/L    Anion Gap 15 5 - 18 mmol/L    Urea Nitrogen 9 8 - 22 mg/dL    Creatinine 0.70 0.60 - 1.10 mg/dL    Calcium 9.0 8.5 - 10.5 mg/dL    Glucose 121 70 - 125 mg/dL    GFR Estimate >90 >60 mL/min/1.73m2   TSH with free T4 reflex    Collection Time: 04/11/22 10:22 AM   Result Value Ref Range    TSH 1.99 0.30 - 5.00 uIU/mL       Vitals:    04/11/22 0906 04/11/22 0954   BP: (!) 162/94 (!) 162/94   BP Location: Right arm Right arm   Patient Position: Sitting Sitting   Cuff Size: Adult Regular Adult Regular   Pulse: 113    SpO2: 100%    Weight: 70.9 kg (156 lb 4.8 oz)      Wt Readings from Last 3 Encounters:   04/11/22 70.9 kg (156 lb 4.8 oz)   09/02/21 75.3 kg (166 lb)   03/01/21 81.6 kg (179 lb 12.8 oz)           Physical Exam:  GENERAL APPEARANCE: 57-year-old female very pleasant, NAD, well hydrated, well nourished  SKIN:  Normal skin turgor, no lesions/rashes   HEENT: moist mucous membranes, no rhinorrhea  NECK: Normal without adenopathy or masses  CV: RRR, no M/G/R   LUNGS: CTAB  ABDOMEN: S&NT, no masses or enlarged organs   EXTREMITY: no edema and full ROM of all joints  NEURO: no focal findings

## 2022-05-11 ENCOUNTER — MYC MEDICAL ADVICE (OUTPATIENT)
Dept: FAMILY MEDICINE | Facility: CLINIC | Age: 57
End: 2022-05-11
Payer: COMMERCIAL

## 2022-05-11 DIAGNOSIS — Z23 NEED FOR SHINGLES VACCINE: Primary | ICD-10-CM

## 2022-05-13 DIAGNOSIS — K29.70 GASTRITIS: ICD-10-CM

## 2022-05-15 RX ORDER — FAMOTIDINE 20 MG/1
TABLET, FILM COATED ORAL
Qty: 180 TABLET | Refills: 3 | Status: SHIPPED | OUTPATIENT
Start: 2022-05-15

## 2022-06-07 ENCOUNTER — ALLIED HEALTH/NURSE VISIT (OUTPATIENT)
Dept: FAMILY MEDICINE | Facility: CLINIC | Age: 57
End: 2022-06-07
Payer: COMMERCIAL

## 2022-06-07 DIAGNOSIS — Z23 NEED FOR SHINGLES VACCINE: ICD-10-CM

## 2022-06-07 DIAGNOSIS — Z23 HIGH PRIORITY FOR 2019-NCOV VACCINE: Primary | ICD-10-CM

## 2022-06-07 PROCEDURE — 90471 IMMUNIZATION ADMIN: CPT | Performed by: FAMILY MEDICINE

## 2022-06-07 PROCEDURE — 91305 COVID-19,PF,PFIZER (12+ YRS): CPT | Performed by: FAMILY MEDICINE

## 2022-06-07 PROCEDURE — 0054A COVID-19,PF,PFIZER (12+ YRS): CPT | Performed by: FAMILY MEDICINE

## 2022-06-07 PROCEDURE — 90750 HZV VACC RECOMBINANT IM: CPT | Performed by: FAMILY MEDICINE

## 2022-07-08 DIAGNOSIS — I10 ESSENTIAL HYPERTENSION, BENIGN: ICD-10-CM

## 2022-07-10 RX ORDER — LOSARTAN POTASSIUM 100 MG/1
TABLET ORAL
Qty: 90 TABLET | Refills: 3 | OUTPATIENT
Start: 2022-07-10

## 2022-07-10 RX ORDER — METOPROLOL SUCCINATE 50 MG/1
TABLET, EXTENDED RELEASE ORAL
Qty: 90 TABLET | Refills: 3 | OUTPATIENT
Start: 2022-07-10

## 2022-10-01 ENCOUNTER — HEALTH MAINTENANCE LETTER (OUTPATIENT)
Age: 57
End: 2022-10-01

## 2022-10-26 ENCOUNTER — OFFICE VISIT (OUTPATIENT)
Dept: FAMILY MEDICINE | Facility: CLINIC | Age: 57
End: 2022-10-26
Payer: COMMERCIAL

## 2022-10-26 VITALS
OXYGEN SATURATION: 100 % | SYSTOLIC BLOOD PRESSURE: 148 MMHG | DIASTOLIC BLOOD PRESSURE: 88 MMHG | HEART RATE: 87 BPM | HEIGHT: 65 IN | BODY MASS INDEX: 25.62 KG/M2 | WEIGHT: 153.8 LBS

## 2022-10-26 DIAGNOSIS — Z00.00 HEALTHCARE MAINTENANCE: ICD-10-CM

## 2022-10-26 DIAGNOSIS — I10 ESSENTIAL HYPERTENSION, BENIGN: Primary | ICD-10-CM

## 2022-10-26 DIAGNOSIS — R73.01 ELEVATED FASTING GLUCOSE: ICD-10-CM

## 2022-10-26 DIAGNOSIS — E78.00 PURE HYPERCHOLESTEROLEMIA: ICD-10-CM

## 2022-10-26 LAB
ALBUMIN SERPL BCG-MCNC: 4.5 G/DL (ref 3.5–5.2)
ALP SERPL-CCNC: 84 U/L (ref 35–104)
ALT SERPL W P-5'-P-CCNC: 25 U/L (ref 10–35)
ANION GAP SERPL CALCULATED.3IONS-SCNC: 10 MMOL/L (ref 7–15)
AST SERPL W P-5'-P-CCNC: 24 U/L (ref 10–35)
BILIRUB SERPL-MCNC: 0.4 MG/DL
BUN SERPL-MCNC: 9.8 MG/DL (ref 6–20)
CALCIUM SERPL-MCNC: 8.8 MG/DL (ref 8.6–10)
CHLORIDE SERPL-SCNC: 103 MMOL/L (ref 98–107)
CHOLEST SERPL-MCNC: 191 MG/DL
CREAT SERPL-MCNC: 0.72 MG/DL (ref 0.51–0.95)
DEPRECATED HCO3 PLAS-SCNC: 24 MMOL/L (ref 22–29)
GFR SERPL CREATININE-BSD FRML MDRD: >90 ML/MIN/1.73M2
GLUCOSE SERPL-MCNC: 111 MG/DL (ref 70–99)
HBA1C MFR BLD: 5.2 % (ref 0–5.6)
HDLC SERPL-MCNC: 39 MG/DL
LDLC SERPL CALC-MCNC: 115 MG/DL
NONHDLC SERPL-MCNC: 152 MG/DL
POTASSIUM SERPL-SCNC: 3.5 MMOL/L (ref 3.4–5.3)
PROT SERPL-MCNC: 7.1 G/DL (ref 6.4–8.3)
SODIUM SERPL-SCNC: 137 MMOL/L (ref 136–145)
TRIGL SERPL-MCNC: 185 MG/DL

## 2022-10-26 PROCEDURE — 36415 COLL VENOUS BLD VENIPUNCTURE: CPT | Performed by: NURSE PRACTITIONER

## 2022-10-26 PROCEDURE — 80061 LIPID PANEL: CPT | Performed by: NURSE PRACTITIONER

## 2022-10-26 PROCEDURE — 83036 HEMOGLOBIN GLYCOSYLATED A1C: CPT | Performed by: NURSE PRACTITIONER

## 2022-10-26 PROCEDURE — 99214 OFFICE O/P EST MOD 30 MIN: CPT | Mod: 25 | Performed by: NURSE PRACTITIONER

## 2022-10-26 PROCEDURE — 90471 IMMUNIZATION ADMIN: CPT | Performed by: NURSE PRACTITIONER

## 2022-10-26 PROCEDURE — 90472 IMMUNIZATION ADMIN EACH ADD: CPT | Performed by: NURSE PRACTITIONER

## 2022-10-26 PROCEDURE — 80053 COMPREHEN METABOLIC PANEL: CPT | Performed by: NURSE PRACTITIONER

## 2022-10-26 PROCEDURE — 90750 HZV VACC RECOMBINANT IM: CPT | Performed by: NURSE PRACTITIONER

## 2022-10-26 PROCEDURE — 90682 RIV4 VACC RECOMBINANT DNA IM: CPT | Performed by: NURSE PRACTITIONER

## 2022-10-26 RX ORDER — METOPROLOL SUCCINATE 100 MG/1
100 TABLET, EXTENDED RELEASE ORAL AT BEDTIME
Qty: 90 TABLET | Refills: 1 | Status: SHIPPED | OUTPATIENT
Start: 2022-10-26 | End: 2023-04-27

## 2022-10-26 RX ORDER — LOSARTAN POTASSIUM 100 MG/1
100 TABLET ORAL DAILY
Qty: 90 TABLET | Refills: 3 | Status: SHIPPED | OUTPATIENT
Start: 2022-10-26 | End: 2023-04-27

## 2022-10-26 RX ORDER — ATORVASTATIN CALCIUM 40 MG/1
40 TABLET, FILM COATED ORAL DAILY
Qty: 90 TABLET | Refills: 3 | Status: SHIPPED | OUTPATIENT
Start: 2022-10-26 | End: 2023-04-27

## 2022-10-26 ASSESSMENT — PAIN SCALES - GENERAL: PAINLEVEL: NO PAIN (0)

## 2022-10-26 NOTE — LETTER
October 27, 2022      Sangeeta Valenzuela  [address] [facility] [address]        Dear ,    We are writing to inform you of your test results.    No evidence for diabetes.  Kidneys, liver, electrolytes look good.  Cholesterol levels are just mildly elevated, but based on your history these are acceptable.  No adjustments are required to your atorvastatin.         Resulted Orders   Hemoglobin A1c   Result Value Ref Range    Hemoglobin A1C 5.2 0.0 - 5.6 %      Comment:      Normal <5.7%   Prediabetes 5.7-6.4%    Diabetes 6.5% or higher     Note: Adopted from ADA consensus guidelines.   Lipid panel   Result Value Ref Range    Cholesterol 191 <200 mg/dL    Triglycerides 185 (H) <150 mg/dL    Direct Measure HDL 39 (L) >=50 mg/dL    LDL Cholesterol Calculated 115 (H) <=100 mg/dL    Non HDL Cholesterol 152 (H) <130 mg/dL    Narrative    Cholesterol  Desirable:  <200 mg/dL    Triglycerides  Normal:  Less than 150 mg/dL  Borderline High:  150-199 mg/dL  High:  200-499 mg/dL  Very High:  Greater than or equal to 500 mg/dL    Direct Measure HDL  Female:  Greater than or equal to 50 mg/dL   Male:  Greater than or equal to 40 mg/dL    LDL Cholesterol  Desirable:  <100mg/dL  Above Desirable:  100-129 mg/dL   Borderline High:  130-159 mg/dL   High:  160-189 mg/dL   Very High:  >= 190 mg/dL    Non HDL Cholesterol  Desirable:  130 mg/dL  Above Desirable:  130-159 mg/dL  Borderline High:  160-189 mg/dL  High:  190-219 mg/dL  Very High:  Greater than or equal to 220 mg/dL   Comprehensive metabolic panel (BMP + Alb, Alk Phos, ALT, AST, Total. Bili, TP)   Result Value Ref Range    Sodium 137 136 - 145 mmol/L    Potassium 3.5 3.4 - 5.3 mmol/L    Chloride 103 98 - 107 mmol/L    Carbon Dioxide (CO2) 24 22 - 29 mmol/L    Anion Gap 10 7 - 15 mmol/L    Urea Nitrogen 9.8 6.0 - 20.0 mg/dL    Creatinine 0.72 0.51 - 0.95 mg/dL    Calcium 8.8 8.6 - 10.0 mg/dL    Glucose 111 (H) 70 - 99 mg/dL    Alkaline Phosphatase  84 35 - 104 U/L    AST 24 10 - 35 U/L    ALT 25 10 - 35 U/L    Protein Total 7.1 6.4 - 8.3 g/dL    Albumin 4.5 3.5 - 5.2 g/dL    Bilirubin Total 0.4 <=1.2 mg/dL    GFR Estimate >90 >60 mL/min/1.73m2      Comment:      Effective December 21, 2021 eGFRcr in adults is calculated using the 2021 CKD-EPI creatinine equation which includes age and gender ( et al., NEJM, DOI: 10.1056/OJKKrs2981330)       If you have any questions or concerns, please call the clinic at the number listed above.       Sincerely,      Riaz Crespo NP

## 2022-10-26 NOTE — PATIENT INSTRUCTIONS
Updating lab work today.    Lets increase the metoprolol to 100 mg.  You can take 2 tablets of the 50 mg at home while waiting for the new dose to arrive    I made sure that you had refills of your other medications as well.    After being on the new metoprolol for a month, schedule a nurse only blood pressure check so we can make sure that the dose is effective.    Flu shot and shingles vaccine given today.

## 2022-10-26 NOTE — PROGRESS NOTES
"Assessment & Plan     ICD-10-CM    1. Essential hypertension, benign  I10 Lipid panel     Comprehensive metabolic panel (BMP + Alb, Alk Phos, ALT, AST, Total. Bili, TP)     metoprolol succinate ER (TOPROL XL) 100 MG 24 hr tablet     losartan (COZAAR) 100 MG tablet     atorvastatin (LIPITOR) 40 MG tablet      2. Familial Hypercholesterolemia  E78.00 Comprehensive metabolic panel (BMP + Alb, Alk Phos, ALT, AST, Total. Bili, TP)      3. Elevated fasting glucose  R73.01 Hemoglobin A1c      4. Healthcare maintenance  Z00.00 INFLUENZA QUAD, RECOMBINANT, P-FREE (RIV4) (FLUBLOK) AGE 50-64 [[id number]]     ZOSTER VACCINE RECOMBINANT ADJUVANTED (SHINGRIX)        Increase metoprolol succinate to 100 mg.  Recheck with nurse BP visit in a month.  Refills of atorvastatin and losartan sent to the pharmacy.  Update labs.  Flu and shingles vaccine given today.    Subjective     HPI     HTN  Elevated on first check today.  Managed with losartan 100 mg and metoprolol succinate 50 mg.  No chest pain, palpitations, lightheadedness, dizziness, lower extremity swelling.  Was elevated earlier this year as well.  Patient reports checking blood pressure at home which is better.    Hypercholesteremia  On atorvastatin 40 mg.  No diffuse myalgias.    Review of Systems - negative except for what's listed in the HPI      Objective    BP (!) 148/88   Pulse 87   Ht 1.638 m (5' 4.5\")   Wt 69.8 kg (153 lb 12.8 oz)   LMP 05/30/2022   SpO2 100%   Breastfeeding No   BMI 25.99 kg/m    Physical Exam   General appearance - alert, well appearing, and in no distress  Mental status - alert, oriented to person, place, and time  Neck - no carotid bruit   Chest - clear to auscultation, no wheezes, rales or rhonchi, symmetric air entry  Heart - normal rate and regular rhythm, S1 and S2 normal, no murmurs noted  Neurological - alert, oriented, normal speech, no focal findings or movement disorder noted, neck supple without rigidity, cranial nerves II through " XII intact, motor and sensory grossly normal bilaterally, normal muscle tone, no tremors, strength 5/5  Extremities - peripheral pulses normal, no peripheral edema  Skin - normal coloration and turgor.    Riaz Crespo, CNP    This note has been dictated using voice recognition software. Any grammatical or context distortions are unintentional and inherent to the software.

## 2022-12-16 ENCOUNTER — HOSPITAL ENCOUNTER (OUTPATIENT)
Dept: MAMMOGRAPHY | Facility: CLINIC | Age: 57
Discharge: HOME | End: 2022-12-16
Attending: NURSE PRACTITIONER | Admitting: NURSE PRACTITIONER
Payer: COMMERCIAL

## 2022-12-16 DIAGNOSIS — Z12.31 VISIT FOR SCREENING MAMMOGRAM: ICD-10-CM

## 2022-12-16 PROCEDURE — 77067 SCR MAMMO BI INCL CAD: CPT

## 2022-12-23 ENCOUNTER — ALLIED HEALTH/NURSE VISIT (OUTPATIENT)
Dept: FAMILY MEDICINE | Facility: CLINIC | Age: 57
End: 2022-12-23
Payer: COMMERCIAL

## 2022-12-23 VITALS — HEART RATE: 94 BPM | OXYGEN SATURATION: 100 % | DIASTOLIC BLOOD PRESSURE: 80 MMHG | SYSTOLIC BLOOD PRESSURE: 162 MMHG

## 2022-12-23 DIAGNOSIS — Z01.30 BP CHECK: Primary | ICD-10-CM

## 2022-12-23 DIAGNOSIS — I10 ESSENTIAL HYPERTENSION, BENIGN: Primary | ICD-10-CM

## 2022-12-23 PROCEDURE — 99207 PR NO CHARGE NURSE ONLY: CPT

## 2022-12-23 RX ORDER — HYDROCHLOROTHIAZIDE 12.5 MG/1
12.5 TABLET ORAL DAILY
Qty: 30 TABLET | Refills: 3 | Status: SHIPPED | OUTPATIENT
Start: 2022-12-23 | End: 2023-04-27

## 2022-12-23 NOTE — PROGRESS NOTES
Sangeeta Valenzuela is a 57 year old patient who comes in today for a Blood Pressure check.  Initial BP:  BP (!) 160/90 (BP Location: Left arm, Patient Position: Sitting, Cuff Size: Adult Large)   Pulse 94   SpO2 100%      94  Disposition: Repeat      Sangeeta Valenzuela is a 57 year old patient who comes in today for a Blood Pressure check.  Initial BP:  BP (!) 162/80 (BP Location: Left arm, Patient Position: Sitting, Cuff Size: Adult Regular)   Pulse 94   SpO2 100%      94  Disposition: provider notified while patient in the clinic    Brayan sent Rx- 12.5 mg Hydrchlorothiazide to [facility] for .  Patient will return in 1 week for BP check.

## 2023-01-24 ENCOUNTER — ALLIED HEALTH/NURSE VISIT (OUTPATIENT)
Dept: FAMILY MEDICINE | Facility: CLINIC | Age: 58
End: 2023-01-24
Payer: COMMERCIAL

## 2023-01-24 VITALS — HEART RATE: 71 BPM | OXYGEN SATURATION: 100 % | DIASTOLIC BLOOD PRESSURE: 80 MMHG | SYSTOLIC BLOOD PRESSURE: 138 MMHG

## 2023-01-24 DIAGNOSIS — I10 ESSENTIAL HYPERTENSION, BENIGN: Primary | ICD-10-CM

## 2023-01-24 PROCEDURE — 99207 PR NO CHARGE NURSE ONLY: CPT

## 2023-01-24 NOTE — PROGRESS NOTES
Sangeeta Valenzuela is a 58 year old patient who comes in today for a Blood Pressure check.  Initial BP:  /80 (BP Location: Right arm, Patient Position: Sitting, Cuff Size: Adult Regular)   Pulse 71   SpO2 100%      71  Disposition: results routed to provider

## 2023-02-05 ENCOUNTER — HEALTH MAINTENANCE LETTER (OUTPATIENT)
Age: 58
End: 2023-02-05

## 2023-02-17 ENCOUNTER — TELEPHONE (OUTPATIENT)
Dept: FAMILY MEDICINE | Facility: CLINIC | Age: 58
End: 2023-02-17

## 2023-04-20 ASSESSMENT — ENCOUNTER SYMPTOMS
CONSTIPATION: 0
NERVOUS/ANXIOUS: 0
PARESTHESIAS: 0
SHORTNESS OF BREATH: 0
HEMATOCHEZIA: 0
PALPITATIONS: 0
HEADACHES: 0
CHILLS: 0
JOINT SWELLING: 0
DYSURIA: 0
FEVER: 0
ARTHRALGIAS: 0
MYALGIAS: 0
WEAKNESS: 0
EYE PAIN: 0
DIZZINESS: 0
FREQUENCY: 0
NAUSEA: 0
BREAST MASS: 0
ABDOMINAL PAIN: 0
DIARRHEA: 0
COUGH: 0
HEMATURIA: 0
HEARTBURN: 0
SORE THROAT: 0

## 2023-04-27 ENCOUNTER — OFFICE VISIT (OUTPATIENT)
Dept: FAMILY MEDICINE | Facility: CLINIC | Age: 58
End: 2023-04-27
Payer: COMMERCIAL

## 2023-04-27 VITALS
BODY MASS INDEX: 24.97 KG/M2 | HEART RATE: 77 BPM | OXYGEN SATURATION: 100 % | RESPIRATION RATE: 16 BRPM | HEIGHT: 65 IN | WEIGHT: 149.9 LBS | SYSTOLIC BLOOD PRESSURE: 138 MMHG | DIASTOLIC BLOOD PRESSURE: 86 MMHG | TEMPERATURE: 98 F

## 2023-04-27 DIAGNOSIS — Z23 NEED FOR VACCINATION: ICD-10-CM

## 2023-04-27 DIAGNOSIS — Z12.4 CERVICAL CANCER SCREENING: ICD-10-CM

## 2023-04-27 DIAGNOSIS — I10 ESSENTIAL HYPERTENSION, BENIGN: ICD-10-CM

## 2023-04-27 DIAGNOSIS — E78.00 PURE HYPERCHOLESTEROLEMIA: ICD-10-CM

## 2023-04-27 DIAGNOSIS — Z00.00 ROUTINE GENERAL MEDICAL EXAMINATION AT A HEALTH CARE FACILITY: Primary | ICD-10-CM

## 2023-04-27 LAB
ALBUMIN SERPL BCG-MCNC: 4.8 G/DL (ref 3.5–5.2)
ALP SERPL-CCNC: 92 U/L (ref 35–104)
ALT SERPL W P-5'-P-CCNC: 26 U/L (ref 10–35)
ANION GAP SERPL CALCULATED.3IONS-SCNC: 13 MMOL/L (ref 7–15)
AST SERPL W P-5'-P-CCNC: 27 U/L (ref 10–35)
BILIRUB SERPL-MCNC: 0.9 MG/DL
BUN SERPL-MCNC: 15.6 MG/DL (ref 6–20)
CALCIUM SERPL-MCNC: 10.1 MG/DL (ref 8.6–10)
CHLORIDE SERPL-SCNC: 101 MMOL/L (ref 98–107)
CHOLEST SERPL-MCNC: 217 MG/DL
CREAT SERPL-MCNC: 0.95 MG/DL (ref 0.51–0.95)
DEPRECATED HCO3 PLAS-SCNC: 26 MMOL/L (ref 22–29)
GFR SERPL CREATININE-BSD FRML MDRD: 70 ML/MIN/1.73M2
GLUCOSE SERPL-MCNC: 120 MG/DL (ref 70–99)
HDLC SERPL-MCNC: 55 MG/DL
LDLC SERPL CALC-MCNC: 132 MG/DL
NONHDLC SERPL-MCNC: 162 MG/DL
POTASSIUM SERPL-SCNC: 4.4 MMOL/L (ref 3.4–5.3)
PROT SERPL-MCNC: 7.4 G/DL (ref 6.4–8.3)
SODIUM SERPL-SCNC: 140 MMOL/L (ref 136–145)
TRIGL SERPL-MCNC: 151 MG/DL

## 2023-04-27 PROCEDURE — 91312 COVID-19 VACCINE BIVALENT BOOSTER 12+ (PFIZER): CPT | Performed by: NURSE PRACTITIONER

## 2023-04-27 PROCEDURE — 87624 HPV HI-RISK TYP POOLED RSLT: CPT | Performed by: NURSE PRACTITIONER

## 2023-04-27 PROCEDURE — 0124A COVID-19 VACCINE BIVALENT BOOSTER 12+ (PFIZER): CPT | Performed by: NURSE PRACTITIONER

## 2023-04-27 PROCEDURE — 99396 PREV VISIT EST AGE 40-64: CPT | Mod: 25 | Performed by: NURSE PRACTITIONER

## 2023-04-27 PROCEDURE — 80053 COMPREHEN METABOLIC PANEL: CPT | Performed by: NURSE PRACTITIONER

## 2023-04-27 PROCEDURE — 36415 COLL VENOUS BLD VENIPUNCTURE: CPT | Performed by: NURSE PRACTITIONER

## 2023-04-27 PROCEDURE — 99214 OFFICE O/P EST MOD 30 MIN: CPT | Mod: 25 | Performed by: NURSE PRACTITIONER

## 2023-04-27 PROCEDURE — G0145 SCR C/V CYTO,THINLAYER,RESCR: HCPCS | Performed by: NURSE PRACTITIONER

## 2023-04-27 PROCEDURE — 80061 LIPID PANEL: CPT | Performed by: NURSE PRACTITIONER

## 2023-04-27 RX ORDER — ATORVASTATIN CALCIUM 40 MG/1
40 TABLET, FILM COATED ORAL DAILY
Qty: 90 TABLET | Refills: 3 | Status: SHIPPED | OUTPATIENT
Start: 2023-04-27 | End: 2024-05-03

## 2023-04-27 RX ORDER — FAMOTIDINE 20 MG/1
TABLET, FILM COATED ORAL
Qty: 180 TABLET | Refills: 3 | Status: CANCELLED | OUTPATIENT
Start: 2023-04-27

## 2023-04-27 RX ORDER — LOSARTAN POTASSIUM 100 MG/1
100 TABLET ORAL DAILY
Qty: 90 TABLET | Refills: 3 | Status: SHIPPED | OUTPATIENT
Start: 2023-04-27 | End: 2024-05-03

## 2023-04-27 RX ORDER — METOPROLOL SUCCINATE 100 MG/1
100 TABLET, EXTENDED RELEASE ORAL AT BEDTIME
Qty: 90 TABLET | Refills: 3 | Status: SHIPPED | OUTPATIENT
Start: 2023-04-27 | End: 2024-02-26

## 2023-04-27 ASSESSMENT — ENCOUNTER SYMPTOMS
HEMATOCHEZIA: 0
DIARRHEA: 0
HEMATURIA: 0
WEAKNESS: 0
PARESTHESIAS: 0
HEADACHES: 0
BREAST MASS: 0
CONSTIPATION: 0
FEVER: 0
JOINT SWELLING: 0
NERVOUS/ANXIOUS: 0
NAUSEA: 0
CHILLS: 0
ABDOMINAL PAIN: 0
ARTHRALGIAS: 0
DIZZINESS: 0
PALPITATIONS: 0
FREQUENCY: 0
HEARTBURN: 0
SHORTNESS OF BREATH: 0
SORE THROAT: 0
EYE PAIN: 0
MYALGIAS: 0
COUGH: 0
DYSURIA: 0

## 2023-04-27 ASSESSMENT — PAIN SCALES - GENERAL: PAINLEVEL: NO PAIN (0)

## 2023-04-27 NOTE — PROGRESS NOTES
SUBJECTIVE:   CC: Sangeeta is an 58 year old who presents for preventive health visit.       4/27/2023    10:33 AM   Additional Questions   Roomed by Kecia Adkins CMA   Patient has been advised of split billing requirements and indicates understanding: Yes     Colonoscopy due in 2024.  Due for Pap smear today.  Did have labs done last October.  Blood pressure shows good control.    Staying active with walking.      Healthy Habits:     Getting at least 3 servings of Calcium per day:  Yes    Bi-annual eye exam:  NO    Dental care twice a year:  Yes    Sleep apnea or symptoms of sleep apnea:  None    Diet:  Regular (no restrictions)    Frequency of exercise:  2-3 days/week    Duration of exercise:  15-30 minutes    Taking medications regularly:  Yes    Medication side effects:  None    PHQ-2 Total Score: 0    Additional concerns today:  No    Today's PHQ-2 Score:       4/20/2023     1:32 PM   PHQ-2 ( 1999 Pfizer)   Q1: Little interest or pleasure in doing things 0   Q2: Feeling down, depressed or hopeless 0   PHQ-2 Score 0   Q1: Little interest or pleasure in doing things Not at all    Not at all   Q2: Feeling down, depressed or hopeless Not at all    Not at all   PHQ-2 Score 0    0           Social History     Tobacco Use     Smoking status: Never     Passive exposure: Never     Smokeless tobacco: Never   Vaping Use     Vaping status: Never Used   Substance Use Topics     Alcohol use: Yes     Alcohol/week: 4.0 standard drinks of alcohol           4/20/2023     1:32 PM   Alcohol Use   Prescreen: >3 drinks/day or >7 drinks/week? No     Reviewed orders with patient.  Reviewed health maintenance and updated orders accordingly - Yes  Lab work is in process    Breast Cancer Screening:    FHS-7:       12/10/2021     7:39 AM 12/16/2022     2:55 PM 4/20/2023     1:35 PM   Breast CA Risk Assessment (FHS-7)   Did any of your first-degree relatives have breast or ovarian cancer? No No No   Did any of your relatives have  bilateral breast cancer? Unknown No Yes   Did any man in your family have breast cancer? No No Yes   Did any woman in your family have breast and ovarian cancer? No No Yes   Did any woman in your family have breast cancer before age 50 y? No No No   Do you have 2 or more relatives with breast and/or ovarian cancer? No No No   Do you have 2 or more relatives with breast and/or bowel cancer? No No No     Mammogram Screening: Recommended mammography every 1-2 years with patient discussion and risk factor consideration  Pertinent mammograms are reviewed under the imaging tab.    History of abnormal Pap smear: NO - age 30-65 PAP every 5 years with negative HPV co-testing recommended      Latest Ref Rng & Units 3/14/2018     9:16 AM 3/4/2015     9:00 AM   PAP / HPV   PAP  Negative for squamous intraepithelial lesion or malignancy  Electronically signed by Katina Lugo CT (ASCP) on 3/20/2018 at  3:25 PM     Negative for squamous intraepithelial lesion or malignancy  Electronically signed by Anamaria Cortes CT (ASCP) on 3/13/2015 at 11:48 AM       HPV 16 DNA NEG Negative      HPV 18 DNA NEG Negative      Other HR HPV NEG Negative        Reviewed and updated as needed this visit by clinical staff   Tobacco  Allergies  Meds    Surg Hx           Reviewed and updated as needed this visit by Provider        Surg Hx          Past Medical History:   Diagnosis Date     Hyperlipemia      Hypertension       Past Surgical History:   Procedure Laterality Date     OPEN REDUCTION INTERNAL FIXATION FIBULA Right 12/13/2013       Review of Systems   Constitutional: Negative for chills and fever.   HENT: Negative for congestion, ear pain, hearing loss and sore throat.    Eyes: Negative for pain and visual disturbance.   Respiratory: Negative for cough and shortness of breath.    Cardiovascular: Negative for chest pain, palpitations and peripheral edema.   Gastrointestinal: Negative for abdominal pain, constipation, diarrhea,  "heartburn, hematochezia and nausea.   Breasts:  Negative for tenderness, breast mass and discharge.   Genitourinary: Negative for dysuria, frequency, genital sores, hematuria, pelvic pain, urgency, vaginal bleeding and vaginal discharge.   Musculoskeletal: Negative for arthralgias, joint swelling and myalgias.   Skin: Negative for rash.   Neurological: Negative for dizziness, weakness, headaches and paresthesias.   Psychiatric/Behavioral: Negative for mood changes. The patient is not nervous/anxious.      OBJECTIVE:   /86 (BP Location: Left arm, Patient Position: Sitting, Cuff Size: Adult Regular)   Pulse 77   Temp 98  F (36.7  C) (Oral)   Resp 16   Ht 1.638 m (5' 4.5\")   Wt 68 kg (149 lb 14.4 oz)   LMP  (LMP Unknown)   SpO2 100%   BMI 25.33 kg/m    Physical Exam  GENERAL: healthy, alert and no distress  EYES: Eyes grossly normal to inspection, PERRL and conjunctivae and sclerae normal  HENT: ear canals and TM's normal, nose and mouth without ulcers or lesions  NECK: no adenopathy, no asymmetry, masses, or scars and thyroid normal to palpation  RESP: lungs clear to auscultation - no rales, rhonchi or wheezes  CV: regular rate and rhythm, normal S1 S2, no S3 or S4, no murmur, click or rub, no peripheral edema and peripheral pulses strong  ABDOMEN: soft, nontender, no hepatosplenomegaly, no masses and bowel sounds normal   (female): normal female external genitalia, normal urethral meatus, vaginal mucosa pink, moist, well rugated, and normal cervix/adnexa/uterus without masses or discharge  MS: no gross musculoskeletal defects noted, no edema  SKIN: no suspicious lesions or rashes  NEURO: Normal strength and tone, mentation intact and speech normal  PSYCH: mentation appears normal, affect normal/bright    Diagnostic Test Results:  Labs reviewed in Epic    ASSESSMENT/PLAN:       ICD-10-CM    1. Routine general medical examination at a health care facility  Z00.00       2. Cervical cancer screening  " "Z12.4 Pap Screen with HPV - recommended age 30 - 65 years      3. Essential hypertension, benign  I10 losartan (COZAAR) 100 MG tablet     metoprolol succinate ER (TOPROL XL) 100 MG 24 hr tablet     Comprehensive metabolic panel (BMP + Alb, Alk Phos, ALT, AST, Total. Bili, TP)      4. Pure hypercholesterolemia  E78.00 atorvastatin (LIPITOR) 40 MG tablet     Lipid panel     Comprehensive metabolic panel (BMP + Alb, Alk Phos, ALT, AST, Total. Bili, TP)      5. Need for vaccination  Z23 COVID-19 VACCINE BIVALENT BOOSTER 12+ (PFIZER)        Doing well.  Encouraged regular exercise.  COVID booster given.  Continue same antihypertensives.  Colonoscopy due next year.  Pap smear collected with chaperone    Patient has been advised of split billing requirements and indicates understanding: No      COUNSELING:  Reviewed preventive health counseling, as reflected in patient instructions      BMI:   Estimated body mass index is 25.33 kg/m  as calculated from the following:    Height as of this encounter: 1.638 m (5' 4.5\").    Weight as of this encounter: 68 kg (149 lb 14.4 oz).         She reports that she has never smoked. She has never been exposed to tobacco smoke. She has never used smokeless tobacco.    Riaz Crespo NP  [facility]  "

## 2023-05-01 DIAGNOSIS — R73.01 ELEVATED FASTING GLUCOSE: Primary | ICD-10-CM

## 2023-05-01 LAB
BKR LAB AP GYN ADEQUACY: NORMAL
BKR LAB AP GYN INTERPRETATION: NORMAL
BKR LAB AP HPV REFLEX: NORMAL
BKR LAB AP PREVIOUS ABNORMAL: NORMAL
PATH REPORT.COMMENTS IMP SPEC: NORMAL
PATH REPORT.COMMENTS IMP SPEC: NORMAL
PATH REPORT.RELEVANT HX SPEC: NORMAL

## 2023-05-03 LAB
HUMAN PAPILLOMA VIRUS 16 DNA: NEGATIVE
HUMAN PAPILLOMA VIRUS 18 DNA: NEGATIVE
HUMAN PAPILLOMA VIRUS FINAL DIAGNOSIS: NORMAL
HUMAN PAPILLOMA VIRUS OTHER HR: NEGATIVE

## 2023-11-16 ENCOUNTER — PATIENT OUTREACH (OUTPATIENT)
Dept: CARE COORDINATION | Facility: CLINIC | Age: 58
End: 2023-11-16
Payer: COMMERCIAL

## 2024-01-12 ENCOUNTER — HOSPITAL ENCOUNTER (OUTPATIENT)
Dept: MAMMOGRAPHY | Facility: CLINIC | Age: 59
Discharge: HOME | End: 2024-01-12
Attending: NURSE PRACTITIONER | Admitting: NURSE PRACTITIONER
Payer: COMMERCIAL

## 2024-01-12 DIAGNOSIS — Z12.31 VISIT FOR SCREENING MAMMOGRAM: ICD-10-CM

## 2024-01-12 PROCEDURE — 77067 SCR MAMMO BI INCL CAD: CPT

## 2024-02-26 DIAGNOSIS — I10 ESSENTIAL HYPERTENSION, BENIGN: ICD-10-CM

## 2024-02-26 RX ORDER — METOPROLOL SUCCINATE 100 MG/1
100 TABLET, EXTENDED RELEASE ORAL AT BEDTIME
Qty: 90 TABLET | Refills: 0 | Status: SHIPPED | OUTPATIENT
Start: 2024-02-26 | End: 2024-05-03

## 2024-04-22 ENCOUNTER — TRANSFERRED RECORDS (OUTPATIENT)
Dept: HEALTH INFORMATION MANAGEMENT | Facility: CLINIC | Age: 59
End: 2024-04-22
Payer: COMMERCIAL

## 2024-04-26 SDOH — HEALTH STABILITY: PHYSICAL HEALTH: ON AVERAGE, HOW MANY DAYS PER WEEK DO YOU ENGAGE IN MODERATE TO STRENUOUS EXERCISE (LIKE A BRISK WALK)?: 4 DAYS

## 2024-04-26 SDOH — HEALTH STABILITY: PHYSICAL HEALTH: ON AVERAGE, HOW MANY MINUTES DO YOU ENGAGE IN EXERCISE AT THIS LEVEL?: 40 MIN

## 2024-04-26 ASSESSMENT — SOCIAL DETERMINANTS OF HEALTH (SDOH): HOW OFTEN DO YOU GET TOGETHER WITH FRIENDS OR RELATIVES?: ONCE A WEEK

## 2024-05-03 ENCOUNTER — OFFICE VISIT (OUTPATIENT)
Dept: FAMILY MEDICINE | Facility: CLINIC | Age: 59
End: 2024-05-03
Attending: NURSE PRACTITIONER
Payer: COMMERCIAL

## 2024-05-03 VITALS
TEMPERATURE: 97 F | HEART RATE: 70 BPM | WEIGHT: 156 LBS | BODY MASS INDEX: 25.99 KG/M2 | OXYGEN SATURATION: 100 % | RESPIRATION RATE: 16 BRPM | DIASTOLIC BLOOD PRESSURE: 94 MMHG | SYSTOLIC BLOOD PRESSURE: 148 MMHG | HEIGHT: 65 IN

## 2024-05-03 DIAGNOSIS — E78.00 PURE HYPERCHOLESTEROLEMIA: ICD-10-CM

## 2024-05-03 DIAGNOSIS — Z00.00 ROUTINE GENERAL MEDICAL EXAMINATION AT A HEALTH CARE FACILITY: Primary | ICD-10-CM

## 2024-05-03 DIAGNOSIS — I10 ESSENTIAL HYPERTENSION, BENIGN: ICD-10-CM

## 2024-05-03 DIAGNOSIS — Z13.1 SCREENING FOR DIABETES MELLITUS: ICD-10-CM

## 2024-05-03 LAB — HBA1C MFR BLD: 5.2 % (ref 0–5.6)

## 2024-05-03 PROCEDURE — 83036 HEMOGLOBIN GLYCOSYLATED A1C: CPT | Performed by: NURSE PRACTITIONER

## 2024-05-03 PROCEDURE — 99396 PREV VISIT EST AGE 40-64: CPT | Performed by: NURSE PRACTITIONER

## 2024-05-03 PROCEDURE — 80061 LIPID PANEL: CPT | Performed by: NURSE PRACTITIONER

## 2024-05-03 PROCEDURE — 36415 COLL VENOUS BLD VENIPUNCTURE: CPT | Performed by: NURSE PRACTITIONER

## 2024-05-03 PROCEDURE — 80053 COMPREHEN METABOLIC PANEL: CPT | Performed by: NURSE PRACTITIONER

## 2024-05-03 RX ORDER — METOPROLOL SUCCINATE 100 MG/1
100 TABLET, EXTENDED RELEASE ORAL AT BEDTIME
Qty: 90 TABLET | Refills: 3 | Status: SHIPPED | OUTPATIENT
Start: 2024-05-03

## 2024-05-03 RX ORDER — ATORVASTATIN CALCIUM 40 MG/1
40 TABLET, FILM COATED ORAL DAILY
Qty: 90 TABLET | Refills: 3 | Status: SHIPPED | OUTPATIENT
Start: 2024-05-03 | End: 2024-07-01

## 2024-05-03 RX ORDER — LOSARTAN POTASSIUM 100 MG/1
100 TABLET ORAL DAILY
Qty: 90 TABLET | Refills: 3 | Status: SHIPPED | OUTPATIENT
Start: 2024-05-03 | End: 2024-07-01

## 2024-05-03 ASSESSMENT — PAIN SCALES - GENERAL: PAINLEVEL: NO PAIN (0)

## 2024-05-03 NOTE — PROGRESS NOTES
"Preventive Care Visit  [facility]  Riaz Crespo NP,    May 3, 2024      Assessment & Plan       ICD-10-CM    1. Routine general medical examination at a health care facility  Z00.00       2. Pure hypercholesterolemia  E78.00 Lipid panel reflex to direct LDL Non-fasting     atorvastatin (LIPITOR) 40 MG tablet     Comprehensive metabolic panel (BMP + Alb, Alk Phos, ALT, AST, Total. Bili, TP)     Lipid panel reflex to direct LDL Non-fasting     Comprehensive metabolic panel (BMP + Alb, Alk Phos, ALT, AST, Total. Bili, TP)      3. Essential hypertension, benign  I10 losartan (COZAAR) 100 MG tablet     metoprolol succinate ER (TOPROL XL) 100 MG 24 hr tablet     Comprehensive metabolic panel (BMP + Alb, Alk Phos, ALT, AST, Total. Bili, TP)     Comprehensive metabolic panel (BMP + Alb, Alk Phos, ALT, AST, Total. Bili, TP)      4. Screening for diabetes mellitus  Z13.1 Hemoglobin A1c     Hemoglobin A1c        Doing well.  Cancer screening up-to-date.  Update screening labs.  Blood pressure elevated.  Check at home and report numbers back to me in 2 weeks and if still elevated consider adding in amlodipine.  Reviewed lump on the thigh.  Ultrasound offered.  She elects to continue to keep an eye on it.  Suspect hematoma but if bothersome she will reach out to me.    BMI  Estimated body mass index is 26.36 kg/m  as calculated from the following:    Height as of this encounter: 1.638 m (5' 4.5\").    Weight as of this encounter: 70.8 kg (156 lb).   Weight management plan: Discussed healthy diet and exercise guidelines    Counseling  Appropriate preventive services were discussed with this patient, including applicable screening as appropriate for fall prevention, nutrition, physical activity, Tobacco-use cessation, weight loss and cognition.  Checklist reviewing preventive services available has been given to the patient.  Reviewed patient's diet, addressing concerns and/or questions.   She is at " risk for psychosocial distress and has been provided with information to reduce risk.       Subjective   Sangeeta is a 59 year old, presenting for the following:  Physical (fasting) and Mass (In Aug pt fell and has had a lump on right thigh at injury site- no pain)        5/3/2024     7:31 AM   Additional Questions   Roomed by Kecia Adkins [facility]        Health Care Directive  Patient does not have a Health Care Directive or Living Will: previously reviewed    HPI    Patient presents today for fasting physical.    Pap smear done April 2023.  Recommendations to repeat in spring 2028.    Mammogram completed in January of this year    Colonoscopy completed in April of this year with recommendations to repeat in spring 2028    Blood pressure uncontrolled.  Asymptomatic.    Patient had a fall and still has a firm lump in her thigh that she would like addressed. Was moving her mother in law taking boxes to the dump. Was carrying a large box and didn't see a crack and he leg went down between there. Had some bruising. Still has a bump there. Not painful.  No redness.         4/26/2024   General Health   How would you rate your overall physical health? Excellent   Feel stress (tense, anxious, or unable to sleep) Only a little   (!) STRESS CONCERN      4/26/2024   Nutrition   Three or more servings of calcium each day? Yes   Diet: Regular (no restrictions)   How many servings of fruit and vegetables per day? (!) 2-3   How many sweetened beverages each day? 0-1         4/26/2024   Exercise   Days per week of moderate/strenous exercise 4 days   Average minutes spent exercising at this level 40 min         4/26/2024   Social Factors   Frequency of gathering with friends or relatives Once a week   Worry food won't last until get money to buy more No   Food not last or not have enough money for food? No   Do you have housing?  Yes   Are you worried about losing your housing? No   Lack of transportation? No   Unable to get utilities  (heat,electricity)? No         4/26/2024   Fall Risk   Fallen 2 or more times in the past year? No   Trouble with walking or balance? No          4/26/2024   Dental   Dentist two times every year? Yes         4/26/2024   TB Screening   Were you born outside of the [place]? No         Today's PHQ-2 Score:       5/3/2024     7:32 AM   PHQ-2 ( 1999 Pfizer)   Q1: Little interest or pleasure in doing things 0   Q2: Feeling down, depressed or hopeless 0   PHQ-2 Score 0   Q1: Little interest or pleasure in doing things Not at all   Q2: Feeling down, depressed or hopeless Not at all   PHQ-2 Score 0           4/26/2024   Substance Use   Alcohol more than 3/day or more than 7/wk No   Do you use any other substances recreationally? (!) ALCOHOL     Social History     Tobacco Use    Smoking status: Never     Passive exposure: Never    Smokeless tobacco: Never   Vaping Use    Vaping status: Never Used   Substance Use Topics    Alcohol use: Yes     Alcohol/week: 4.0 standard drinks of alcohol    Drug use: No         1/12/2024   LAST FHS-7 RESULTS   1st degree relative breast or ovarian cancer No   Any relative bilateral breast cancer No   Any male have breast cancer No   Any ONE woman have BOTH breast AND ovarian cancer No   Any woman with breast cancer before 50yrs No   2 or more relatives with breast AND/OR ovarian cancer Yes   2 or more relatives with breast AND/OR bowel cancer Yes   Mammogram Screening - Mammogram every 1-2 years updated in Health Maintenance based on mutual decision making        4/26/2024   STI Screening   New sexual partner(s) since last STI/HIV test? No     History of abnormal Pap smear: NO - age 30-65 PAP every 5 years with negative HPV co-testing recommended        Latest Ref Rng & Units 4/27/2023    10:59 AM 3/14/2018     9:16 AM 3/4/2015     9:00 AM   PAP / HPV   PAP  Negative for Intraepithelial Lesion or Malignancy (NILM)  Negative for squamous intraepithelial lesion or malignancy  Electronically signed  "by Katina Lugo CT (ASCP) on 3/20/2018 at  3:25 PM    Negative for squamous intraepithelial lesion or malignancy  Electronically signed by Anamaria Cortes CT (ASCP) on 3/13/2015 at 11:48 AM      HPV 16 DNA Negative Negative  Negative     HPV 18 DNA Negative Negative  Negative     Other HR HPV Negative Negative  Negative       ASCVD Risk   The 10-year ASCVD risk score (Tara LEE, et al., 2019) is: 5.2%    Values used to calculate the score:      Age: 59 years      Sex: Female      Is Non- : No      Diabetic: No      Tobacco smoker: No      Systolic Blood Pressure: 148 mmHg      Is BP treated: Yes      HDL Cholesterol: 55 mg/dL      Total Cholesterol: 217 mg/dL    Reviewed and updated as needed this visit by Provider                    Past Medical History:   Diagnosis Date    Hyperlipemia     Hypertension      Past Surgical History:   Procedure Laterality Date    OPEN REDUCTION INTERNAL FIXATION FIBULA Right 12/13/2013         Review of Systems  Constitutional, HEENT, cardiovascular, pulmonary, gi and gu systems are negative, except as otherwise noted.     Objective    Exam  BP (!) 148/94 (BP Location: Left arm, Patient Position: Sitting, Cuff Size: Adult Regular)   Pulse 70   Temp 97  F (36.1  C) (Oral)   Resp 16   Ht 1.638 m (5' 4.5\")   Wt 70.8 kg (156 lb)   LMP  (LMP Unknown)   SpO2 100%   BMI 26.36 kg/m     Estimated body mass index is 26.36 kg/m  as calculated from the following:    Height as of this encounter: 1.638 m (5' 4.5\").    Weight as of this encounter: 70.8 kg (156 lb).    Physical Exam  GENERAL: alert and no distress  EYES: Eyes grossly normal to inspection, PERRL and conjunctivae and sclerae normal  HENT: ear canals and TM's normal, nose and mouth without ulcers or lesions  NECK: no adenopathy, no asymmetry, masses, or scars  RESP: lungs clear to auscultation - no rales, rhonchi or wheezes  CV: regular rate and rhythm, normal S1 S2, no S3 or S4, no " murmur, click or rub, no peripheral edema  ABDOMEN: soft, nontender, no hepatosplenomegaly, no masses and bowel sounds normal  MS: no gross musculoskeletal defects noted, no edema  SKIN: no suspicious lesions or rashes.  On the right thigh lateral side there is a firm palpable lump measuring approximately 4 cm in diameter.  None mobile.  Slightly compressible.  No surrounding erythema.  No fluctuance.  NEURO: Normal strength and tone, mentation intact and speech normal  PSYCH: mentation appears normal, affect normal/bright

## 2024-05-03 NOTE — PATIENT INSTRUCTIONS
Check blood pressure at home a few times a week for 2 weeks then report numbers back to me.     Updating annual blood work today.    If that lump gets larger or uncomfortable, do not hesitate to reach out and we can get an ultrasound and connect you with general surgery to get it removed.    Preventive Care Advice   This is general advice given by our system to help you stay healthy. However, your care team may have specific advice just for you. Please talk to your care team about your preventive care needs.  Nutrition  Eat 5 or more servings of fruits and vegetables each day.  Try wheat bread, brown rice and whole grain pasta (instead of white bread, rice, and pasta).  Get enough calcium and vitamin D. Check the label on foods and aim for 100% of the RDA (recommended daily allowance).  Lifestyle  Exercise at least 150 minutes each week   (30 minutes a day, 5 days a week).  Do muscle strengthening activities 2 days a week. These help control your weight and prevent disease.  No smoking.  Wear sunscreen to prevent skin cancer.  Have a dental exam and cleaning every 6 months.  Yearly exams  See your health care team every year to talk about:  Any changes in your health.  Any medicines your care team has prescribed.  Preventive care, family planning, and ways to prevent chronic diseases.  Shots (vaccines)   HPV shots (up to age 26), if you've never had them before.  Hepatitis B shots (up to age 59), if you've never had them before.  COVID-19 shot: Get this shot when it's due.  Flu shot: Get a flu shot every year.  Tetanus shot: Get a tetanus shot every 10 years.  Pneumococcal, hepatitis A, and RSV shots: Ask your care team if you need these based on your risk.  Shingles shot (for age 50 and up).  General health tests  Diabetes screening:  Starting at age 35, Get screened for diabetes at least every 3 years.  If you are younger than age 35, ask your care team if you should be screened for diabetes.  Cholesterol test: At  age 39, start having a cholesterol test every 5 years, or more often if advised.  Bone density scan (DEXA): At age 50, ask your care team if you should have this scan for osteoporosis (brittle bones).  Hepatitis C: Get tested at least once in your life.  STIs (sexually transmitted infections)  Before age 24: Ask your care team if you should be screened for STIs.  After age 24: Get screened for STIs if you're at risk. You are at risk for STIs (including HIV) if:  You are sexually active with more than one person.  You don't use condoms every time.  You or a partner was diagnosed with a sexually transmitted infection.  If you are at risk for HIV, ask about PrEP medicine to prevent HIV.  Get tested for HIV at least once in your life, whether you are at risk for HIV or not.  Cancer screening tests  Cervical cancer screening: If you have a cervix, begin getting regular cervical cancer screening tests at age 21. Most people who have regular screenings with normal results can stop after age 65. Talk about this with your provider.  Breast cancer scan (mammogram): If you've ever had breasts, begin having regular mammograms starting at age 40. This is a scan to check for breast cancer.  Colon cancer screening: It is important to start screening for colon cancer at age 45.  Have a colonoscopy test every 10 years (or more often if you're at risk) Or, ask your provider about stool tests like a FIT test every year or Cologuard test every 3 years.  To learn more about your testing options, visit: [email or web address].  For help making a decision, visit: https://bit.ly/bv79117.  Prostate cancer screening test: If you have a prostate and are age 55 to 69, ask your provider if you would benefit from a yearly prostate cancer screening test.  Lung cancer screening: If you are a current or former smoker age 50 to 80, ask your care team if ongoing lung cancer screenings are right for you.  For informational purposes only.  Not to replace the advice of your health care provider. Copyright   2023 [facility]. All rights reserved. Clinically reviewed by the [facility]. [employer] 086047 - REV 01/24.    Learning About Stress  What is stress?     Stress is your body's response to a hard situation. Your body can have a physical, emotional, or mental response. Stress is a fact of life for most people, and it affects everyone differently. What causes stress for you may not be stressful for someone else.  A lot of things can cause stress. You may feel stress when you go on a job interview, take a test, or run a race. This kind of short-term stress is normal and even useful. It can help you if you need to work hard or react quickly. For example, stress can help you finish an important job on time.  Long-term stress is caused by ongoing stressful situations or events. Examples of long-term stress include long-term health problems, ongoing problems at work, or conflicts in your family. Long-term stress can harm your health.  How does stress affect your health?  When you are stressed, your body responds as though you are in danger. It makes hormones that speed up your heart, make you breathe faster, and give you a burst of energy. This is called the fight-or-flight stress response. If the stress is over quickly, your body goes back to normal and no harm is done.  But if stress happens too often or lasts too long, it can have bad effects. Long-term stress can make you more likely to get sick, and it can make symptoms of some diseases worse. If you tense up when you are stressed, you may develop neck, shoulder, or low back pain. Stress is linked to high blood pressure and heart disease.  Stress also harms your emotional health. It can make you ambriz, tense, or depressed. Your relationships may suffer, and you may not do well at work or school.  What can you do to manage stress?  You can try these things to  help manage stress:   Do something active. Exercise or activity can help reduce stress. Walking is a great way to get started. Even everyday activities such as housecleaning or yard work can help.  Try yoga or imani chi. These techniques combine exercise and meditation. You may need some training at first to learn them.  Do something you enjoy. For example, listen to music or go to a movie. Practice your hobby or do volunteer work.  Meditate. This can help you relax, because you are not worrying about what happened before or what may happen in the future.  Do guided imagery. Imagine yourself in any setting that helps you feel calm. You can use online videos, books, or a teacher to guide you.  Do breathing exercises. For example:  From a standing position, bend forward from the waist with your knees slightly bent. Let your arms dangle close to the floor.  Breathe in slowly and deeply as you return to a standing position. Roll up slowly and lift your head last.  Hold your breath for just a few seconds in the standing position.  Breathe out slowly and bend forward from the waist.  Let your feelings out. Talk, laugh, cry, and express anger when you need to. Talking with supportive friends or family, a counselor, or a kyleigh leader about your feelings is a healthy way to relieve stress. Avoid discussing your feelings with people who make you feel worse.  Write. It may help to write about things that are bothering you. This helps you find out how much stress you feel and what is causing it. When you know this, you can find better ways to cope.  What can you do to prevent stress?  You might try some of these things to help prevent stress:  Manage your time. This helps you find time to do the things you want and need to do.  Get enough sleep. Your body recovers from the stresses of the day while you are sleeping.  Get support. Your family, friends, and community can make a difference in how you experience stress.  Limit your  "news feed. Avoid or limit time on social media or news that may make you feel stressed.  Do something active. Exercise or activity can help reduce stress. Walking is a great way to get started.  Where can you learn more?  Go to https://www.Tail.net/patiented  Enter N032 in the search box to learn more about \"Learning About Stress.\"  Current as of: October 24, 2023               Content Version: 14.0    2296-1943 [employer].   Care instructions adapted under license by your healthcare professional. If you have questions about a medical condition or this instruction, always ask your healthcare professional. [employer] disclaims any warranty or liability for your use of this information.      Substance Use Disorder: Care Instructions  Overview     You can improve your life and health by stopping your use of alcohol or drugs. When you don't drink or use drugs, you may feel and sleep better. You may get along better with your family, friends, and coworkers. There are medicines and programs that can help with substance use disorder.  How can you care for yourself at home?  Here are some ways to help you stay sober and prevent relapse.  If you have been given medicine to help keep you sober or reduce your cravings, be sure to take it exactly as prescribed.  Talk to your doctor about programs that can help you stop using drugs or drinking alcohol.  Do not keep alcohol or drugs in your home.  Plan ahead. Think about what you'll say if other people ask you to drink or use drugs. Try not to spend time with people who drink or use drugs.  Use the time and money spent on drinking or drugs to do something that's important to you.  Preventing a relapse  Have a plan to deal with relapse. Learn to recognize changes in your thinking that lead you to drink or use drugs. Get help before you start to drink or use drugs again.  Try to stay away from situations, friends, or places that may lead you to " drink or use drugs.  If you feel the need to drink alcohol or use drugs again, seek help right away. Call a trusted friend or family member. Some people get support from organizations such as Narcotics Anonymous or [address] or from treatment facilities.  If you relapse, get help as soon as you can. Some people make a plan with another person that outlines what they want that person to do for them if they relapse. The plan usually includes how to handle the relapse and who to notify in case of relapse.  Don't give up. Remember that a relapse doesn't mean that you have failed. Use the experience to learn the triggers that lead you to drink or use drugs. Then quit again. Recovery is a lifelong process. Many people have several relapses before they are able to quit for good.  Follow-up care is a key part of your treatment and safety. Be sure to make and go to all appointments, and call your doctor if you are having problems. It's also a good idea to know your test results and keep a list of the medicines you take.  When should you call for help?   Call 911  anytime you think you may need emergency care. For example, call if you or someone else:    Has overdosed or has withdrawal signs. Be sure to tell the emergency workers that you are or someone else is using or trying to quit using drugs. Overdose or withdrawal signs may include:  Losing consciousness.  Seizure.  Seeing or hearing things that aren't there (hallucinations).     Is thinking or talking about suicide or harming others.   Where to get help 24 hours a day, 7 days a week   If you or someone you know talks about suicide, self-harm, a mental health crisis, a substance use crisis, or any other kind of emotional distress, get help right away. You can:    Call the Suicide and Crisis Lifeline at 686.     Call 0-320-538-TALK (1-659.737.2479).     Text HOME to 146268 to access the Crisis Text Line.   Consider saving these numbers in your phone.  Go to  "[email or web address] for more information or to chat online.  Call your doctor now or seek immediate medical care if:    You are having withdrawal symptoms. These may include nausea or vomiting, sweating, shakiness, and anxiety.   Watch closely for changes in your health, and be sure to contact your doctor if:    You have a relapse.     You need more help or support to stop.   Where can you learn more?  Go to https://www.Pixia.net/patiented  Enter H573 in the search box to learn more about \"Substance Use Disorder: Care Instructions.\"  Current as of: November 15, 2023               Content Version: 14.0    6262-8057 [employer].   Care instructions adapted under license by your healthcare professional. If you have questions about a medical condition or this instruction, always ask your healthcare professional. [employer] disclaims any warranty or liability for your use of this information.      "

## 2024-05-04 LAB
ALBUMIN SERPL BCG-MCNC: 4.5 G/DL (ref 3.5–5.2)
ALP SERPL-CCNC: 89 U/L (ref 40–150)
ALT SERPL W P-5'-P-CCNC: 33 U/L (ref 0–50)
ANION GAP SERPL CALCULATED.3IONS-SCNC: 13 MMOL/L (ref 7–15)
AST SERPL W P-5'-P-CCNC: 33 U/L (ref 0–45)
BILIRUB SERPL-MCNC: 0.3 MG/DL
BUN SERPL-MCNC: 18.8 MG/DL (ref 6–20)
CALCIUM SERPL-MCNC: 9.9 MG/DL (ref 8.6–10)
CHLORIDE SERPL-SCNC: 104 MMOL/L (ref 98–107)
CHOLEST SERPL-MCNC: 213 MG/DL
CREAT SERPL-MCNC: 0.98 MG/DL (ref 0.51–0.95)
DEPRECATED HCO3 PLAS-SCNC: 25 MMOL/L (ref 22–29)
EGFRCR SERPLBLD CKD-EPI 2021: 67 ML/MIN/1.73M2
FASTING STATUS PATIENT QL REPORTED: YES
GLUCOSE SERPL-MCNC: 110 MG/DL (ref 70–99)
HDLC SERPL-MCNC: 50 MG/DL
LDLC SERPL CALC-MCNC: 121 MG/DL
NONHDLC SERPL-MCNC: 163 MG/DL
POTASSIUM SERPL-SCNC: 4.6 MMOL/L (ref 3.4–5.3)
PROT SERPL-MCNC: 7.1 G/DL (ref 6.4–8.3)
SODIUM SERPL-SCNC: 142 MMOL/L (ref 135–145)
TRIGL SERPL-MCNC: 209 MG/DL

## 2024-07-01 DIAGNOSIS — E78.00 PURE HYPERCHOLESTEROLEMIA: ICD-10-CM

## 2024-07-01 DIAGNOSIS — I10 ESSENTIAL HYPERTENSION, BENIGN: ICD-10-CM

## 2024-07-01 RX ORDER — ATORVASTATIN CALCIUM 40 MG/1
40 TABLET, FILM COATED ORAL DAILY
Qty: 90 TABLET | Refills: 2 | Status: SHIPPED | OUTPATIENT
Start: 2024-07-01

## 2024-07-01 RX ORDER — LOSARTAN POTASSIUM 100 MG/1
100 TABLET ORAL DAILY
Qty: 90 TABLET | Refills: 2 | Status: SHIPPED | OUTPATIENT
Start: 2024-07-01

## 2024-11-15 ENCOUNTER — TELEPHONE (OUTPATIENT)
Dept: FAMILY MEDICINE | Facility: CLINIC | Age: 59
End: 2024-11-15
Payer: COMMERCIAL

## 2024-11-15 NOTE — TELEPHONE ENCOUNTER
Forms/Letter Request    Type of form/letter: FMLA - Unknown       Have you been seen for this request: No    Do we have the form/letter: Yes: patient needs FMLA paper work for her job she goes to treatment on 11/18/24    When is form/letter needed by: ASAP    How would you like the form/letter returned:      Patient Notified form requests are processed in 3-5 business days:Yes    Could we send this information to you in [employer] or would you prefer to receive a phone call?:   Patient would prefer a phone call   Okay to leave a detailed message?: Yes at Cell number on file:    Telephone Information:   Mobile [phone]

## 2024-11-19 NOTE — TELEPHONE ENCOUNTER
Left message to call back for: Leonid/ There  Information to relay to patient: FMLA form will need to be provided to us, can be faxed to [phone] or dropped off. After form is reviewed a visit may be required as pt has not seen provider since May. It may be best to have treatment center complete FMLA paper work as they have a background of the situation and will know how long she needs to be off work.

## 2025-03-16 ENCOUNTER — HEALTH MAINTENANCE LETTER (OUTPATIENT)
Age: 60
End: 2025-03-16

## 2025-04-03 ENCOUNTER — PATIENT OUTREACH (OUTPATIENT)
Dept: CARE COORDINATION | Facility: CLINIC | Age: 60
End: 2025-04-03
Payer: COMMERCIAL

## 2025-04-17 ENCOUNTER — PATIENT OUTREACH (OUTPATIENT)
Dept: CARE COORDINATION | Facility: CLINIC | Age: 60
End: 2025-04-17
Payer: COMMERCIAL

## 2025-05-21 DIAGNOSIS — I10 ESSENTIAL HYPERTENSION, BENIGN: ICD-10-CM

## 2025-05-22 RX ORDER — METOPROLOL SUCCINATE 100 MG/1
100 TABLET, EXTENDED RELEASE ORAL DAILY
Qty: 90 TABLET | Refills: 0 | Status: SHIPPED | OUTPATIENT
Start: 2025-05-22

## 2025-05-28 DIAGNOSIS — E78.00 PURE HYPERCHOLESTEROLEMIA: ICD-10-CM

## 2025-05-29 RX ORDER — ATORVASTATIN CALCIUM 40 MG/1
40 TABLET, FILM COATED ORAL DAILY
Qty: 30 TABLET | Refills: 0 | Status: SHIPPED | OUTPATIENT
Start: 2025-05-29

## 2025-06-10 ENCOUNTER — TELEPHONE (OUTPATIENT)
Dept: FAMILY MEDICINE | Facility: CLINIC | Age: 60
End: 2025-06-10
Payer: COMMERCIAL

## 2025-06-10 NOTE — TELEPHONE ENCOUNTER
Received word from medical examiner's office that patient  on 2025.  Please make sure chart is updated.    Riaz Crespo NP

## 2025-06-10 NOTE — TELEPHONE ENCOUNTER
Notification of a  Patient form completed and given to admin.    Brenda Webb MA on 6/10/2025 at 8:57 AM